# Patient Record
Sex: MALE | Race: WHITE | Employment: FULL TIME | URBAN - METROPOLITAN AREA
[De-identification: names, ages, dates, MRNs, and addresses within clinical notes are randomized per-mention and may not be internally consistent; named-entity substitution may affect disease eponyms.]

---

## 2017-04-27 ENCOUNTER — APPOINTMENT (OUTPATIENT)
Dept: CT IMAGING | Age: 47
End: 2017-04-27
Attending: EMERGENCY MEDICINE
Payer: COMMERCIAL

## 2017-04-27 ENCOUNTER — HOSPITAL ENCOUNTER (EMERGENCY)
Age: 47
Discharge: HOME | End: 2017-04-27
Attending: EMERGENCY MEDICINE | Admitting: EMERGENCY MEDICINE
Payer: COMMERCIAL

## 2017-04-27 VITALS
WEIGHT: 165.34 LBS | HEART RATE: 65 BPM | RESPIRATION RATE: 16 BRPM | DIASTOLIC BLOOD PRESSURE: 82 MMHG | BODY MASS INDEX: 25.06 KG/M2 | TEMPERATURE: 97.6 F | SYSTOLIC BLOOD PRESSURE: 120 MMHG | HEIGHT: 68 IN | OXYGEN SATURATION: 96 %

## 2017-04-27 DIAGNOSIS — G43.009 NONINTRACTABLE MIGRAINE, UNSPECIFIED MIGRAINE TYPE: Primary | ICD-10-CM

## 2017-04-27 LAB
ALBUMIN SERPL BCP-MCNC: 3.7 G/DL (ref 3.5–5)
ALBUMIN/GLOB SERPL: 1.2 {RATIO} (ref 1.1–2.2)
ALP SERPL-CCNC: 35 U/L (ref 45–117)
ALT SERPL-CCNC: 29 U/L (ref 12–78)
ANION GAP BLD CALC-SCNC: 8 MMOL/L (ref 5–15)
AST SERPL W P-5'-P-CCNC: 13 U/L (ref 15–37)
BASOPHILS # BLD AUTO: 0 K/UL (ref 0–0.1)
BASOPHILS # BLD: 0 % (ref 0–1)
BILIRUB SERPL-MCNC: 0.3 MG/DL (ref 0.2–1)
BUN SERPL-MCNC: 15 MG/DL (ref 6–20)
BUN/CREAT SERPL: 16 (ref 12–20)
CALCIUM SERPL-MCNC: 9 MG/DL (ref 8.5–10.1)
CHLORIDE SERPL-SCNC: 104 MMOL/L (ref 97–108)
CO2 SERPL-SCNC: 29 MMOL/L (ref 21–32)
CREAT SERPL-MCNC: 0.93 MG/DL (ref 0.7–1.3)
EOSINOPHIL # BLD: 0.3 K/UL (ref 0–0.4)
EOSINOPHIL NFR BLD: 4 % (ref 0–7)
ERYTHROCYTE [DISTWIDTH] IN BLOOD BY AUTOMATED COUNT: 12.7 % (ref 11.5–14.5)
GLOBULIN SER CALC-MCNC: 3.1 G/DL (ref 2–4)
GLUCOSE SERPL-MCNC: 109 MG/DL (ref 65–100)
HCT VFR BLD AUTO: 37.5 % (ref 36.6–50.3)
HGB BLD-MCNC: 13 G/DL (ref 12.1–17)
LYMPHOCYTES # BLD AUTO: 48 % (ref 12–49)
LYMPHOCYTES # BLD: 3.6 K/UL (ref 0.8–3.5)
MCH RBC QN AUTO: 30.7 PG (ref 26–34)
MCHC RBC AUTO-ENTMCNC: 34.7 G/DL (ref 30–36.5)
MCV RBC AUTO: 88.4 FL (ref 80–99)
MONOCYTES # BLD: 0.7 K/UL (ref 0–1)
MONOCYTES NFR BLD AUTO: 9 % (ref 5–13)
NEUTS SEG # BLD: 2.9 K/UL (ref 1.8–8)
NEUTS SEG NFR BLD AUTO: 39 % (ref 32–75)
PLATELET # BLD AUTO: 242 K/UL (ref 150–400)
POTASSIUM SERPL-SCNC: 3.6 MMOL/L (ref 3.5–5.1)
PROT SERPL-MCNC: 6.8 G/DL (ref 6.4–8.2)
RBC # BLD AUTO: 4.24 M/UL (ref 4.1–5.7)
SODIUM SERPL-SCNC: 141 MMOL/L (ref 136–145)
WBC # BLD AUTO: 7.6 K/UL (ref 4.1–11.1)

## 2017-04-27 PROCEDURE — 70450 CT HEAD/BRAIN W/O DYE: CPT

## 2017-04-27 PROCEDURE — 80053 COMPREHEN METABOLIC PANEL: CPT | Performed by: EMERGENCY MEDICINE

## 2017-04-27 PROCEDURE — 99283 EMERGENCY DEPT VISIT LOW MDM: CPT

## 2017-04-27 PROCEDURE — 74011250636 HC RX REV CODE- 250/636: Performed by: EMERGENCY MEDICINE

## 2017-04-27 PROCEDURE — 85025 COMPLETE CBC W/AUTO DIFF WBC: CPT | Performed by: EMERGENCY MEDICINE

## 2017-04-27 PROCEDURE — 96375 TX/PRO/DX INJ NEW DRUG ADDON: CPT

## 2017-04-27 PROCEDURE — 36415 COLL VENOUS BLD VENIPUNCTURE: CPT | Performed by: EMERGENCY MEDICINE

## 2017-04-27 PROCEDURE — 96365 THER/PROPH/DIAG IV INF INIT: CPT

## 2017-04-27 RX ORDER — BUTALBITAL, ACETAMINOPHEN AND CAFFEINE 300; 40; 50 MG/1; MG/1; MG/1
1-2 CAPSULE ORAL
Qty: 20 CAP | Refills: 0 | Status: ON HOLD | OUTPATIENT
Start: 2017-04-27 | End: 2021-09-15

## 2017-04-27 RX ORDER — PROCHLORPERAZINE EDISYLATE 5 MG/ML
10 INJECTION INTRAMUSCULAR; INTRAVENOUS
Status: COMPLETED | OUTPATIENT
Start: 2017-04-27 | End: 2017-04-27

## 2017-04-27 RX ORDER — MAGNESIUM SULFATE 1 G/100ML
1 INJECTION INTRAVENOUS
Status: COMPLETED | OUTPATIENT
Start: 2017-04-27 | End: 2017-04-27

## 2017-04-27 RX ORDER — DEXAMETHASONE SODIUM PHOSPHATE 4 MG/ML
10 INJECTION, SOLUTION INTRA-ARTICULAR; INTRALESIONAL; INTRAMUSCULAR; INTRAVENOUS; SOFT TISSUE
Status: COMPLETED | OUTPATIENT
Start: 2017-04-27 | End: 2017-04-27

## 2017-04-27 RX ORDER — DEXAMETHASONE SODIUM PHOSPHATE 4 MG/ML
INJECTION, SOLUTION INTRA-ARTICULAR; INTRALESIONAL; INTRAMUSCULAR; INTRAVENOUS; SOFT TISSUE
Status: DISCONTINUED
Start: 2017-04-27 | End: 2017-04-27 | Stop reason: HOSPADM

## 2017-04-27 RX ADMIN — DEXAMETHASONE SODIUM PHOSPHATE 10 MG: 4 INJECTION, SOLUTION INTRAMUSCULAR; INTRAVENOUS at 01:28

## 2017-04-27 RX ADMIN — MAGNESIUM SULFATE HEPTAHYDRATE 1 G: 1 INJECTION, SOLUTION INTRAVENOUS at 01:26

## 2017-04-27 RX ADMIN — PROCHLORPERAZINE EDISYLATE 10 MG: 5 INJECTION INTRAMUSCULAR; INTRAVENOUS at 01:28

## 2017-04-27 RX ADMIN — SODIUM CHLORIDE 1000 ML: 900 INJECTION, SOLUTION INTRAVENOUS at 01:26

## 2017-04-27 NOTE — ED PROVIDER NOTES
HPI Comments: Herman Copeland is a 48 y.o. male with no significant PMHx who presents ambulatory to the ED with c/o a 10/10 headache that began ~10:30 tonight with associated photophobia. Pt reports he had a similar HA this morning and one yesterday, but neither were as bad as tonight's. Pt reports he had an episode of blurry, \"spotty\" vision yesterday while he was at work. Pt states he tries to limit his caffeine intake to 16 oz daily. Pt denies any h/o migraines. He denies any changes in the amount he sleeps, other changes to his usual routines, or increased stress. Pt specifically denies any weakness in arms or legs, fevers, or chills. PCP: Christo Maldonado MD     Social hx: - Smoker, + EtOH, - Illicit Drugs    There are no other complaints, changes, or physical findings at this time. Written by DAGO Bhatia, as dictated by Marilyn Zuniga MD.      The history is provided by the patient. No  was used. No past medical history on file. No past surgical history on file. Family History:   Problem Relation Age of Onset    Diabetes Father     Cancer Father        Social History     Social History    Marital status:      Spouse name: N/A    Number of children: N/A    Years of education: N/A     Occupational History    Not on file. Social History Main Topics    Smoking status: Never Smoker    Smokeless tobacco: Never Used    Alcohol use 3.0 oz/week     6 Cans of beer per week      Comment: weekly    Drug use: No    Sexual activity: Yes     Partners: Male     Other Topics Concern    Not on file     Social History Narrative    No narrative on file         ALLERGIES: Pcn [penicillins]    Review of Systems   Constitutional: Negative for chills and fever. HENT: Negative for congestion, rhinorrhea, sneezing and sore throat. Eyes: Positive for photophobia. Negative for redness and visual disturbance. Respiratory: Negative.   Negative for cough, shortness of breath and wheezing. Cardiovascular: Negative. Negative for chest pain and leg swelling. Gastrointestinal: Negative. Negative for abdominal pain, diarrhea, nausea and vomiting. Genitourinary: Negative. Negative for difficulty urinating, discharge and frequency. Musculoskeletal: Negative. Negative for arthralgias, back pain, myalgias and neck stiffness. Skin: Negative. Negative for color change and rash. Neurological: Positive for headaches. Negative for dizziness, syncope, weakness and numbness. Hematological: Negative for adenopathy. Psychiatric/Behavioral: Negative. All other systems reviewed and are negative. Patient Vitals for the past 24 hrs:   Temp Pulse Resp BP SpO2   04/27/17 0227 - - - 120/82 96 %   04/27/17 0215 - - - - 97 %   04/27/17 0130 - - - 120/78 97 %   04/27/17 0126 - 65 - 123/81 -   04/27/17 0030 97.6 °F (36.4 °C) (!) 55 16 134/88 100 %            Physical Exam   Constitutional: He is oriented to person, place, and time. HENT:   Head: Atraumatic. Eyes: EOM are normal.   + Photophobia   Neck: No spinous process tenderness and no muscular tenderness present.   + Full ROM  - Meningismus signs     Cardiovascular: Normal rate, regular rhythm, normal heart sounds and intact distal pulses. Exam reveals no gallop and no friction rub. No murmur heard. Pulmonary/Chest: Effort normal and breath sounds normal. No respiratory distress. He has no wheezes. He has no rales. He exhibits no tenderness. Abdominal: Soft. Bowel sounds are normal. He exhibits no distension and no mass. There is no tenderness. There is no rebound and no guarding. Musculoskeletal: Normal range of motion. He exhibits no edema or tenderness. Neurological: He is alert and oriented to person, place, and time.    EOM intact, pupils direct and consensual, reflexes intact, CN II-XII grossly intact, strength equal and symmetric, alert and oriented     Psychiatric: He has a normal mood and affect. Nursing note and vitals reviewed. MDM  Number of Diagnoses or Management Options  Nonintractable migraine, unspecified migraine type:   Diagnosis management comments: Assessment/Plan: Migraine, headache, cluster HA, tension HA. Low suspicion for subarachnoid etiology given intermittent nature of symptoms over the past two days. Pt is driving home and prefers not to have any sedating medications. Amount and/or Complexity of Data Reviewed  Clinical lab tests: ordered and reviewed  Tests in the radiology section of CPT®: ordered and reviewed  Review and summarize past medical records: yes    Patient Progress  Patient progress: stable        Procedures  Progress note:  2:27 AM  Pt reports his HA feels much better now. He feels comfortable to go home, and would like to be discharged. Written by Dyan Suazo ED Scribe, as dictated by Kate Contreras MD.    LABORATORY TESTS:  Recent Results (from the past 12 hour(s))   CBC WITH AUTOMATED DIFF    Collection Time: 04/27/17  1:04 AM   Result Value Ref Range    WBC 7.6 4.1 - 11.1 K/uL    RBC 4.24 4.10 - 5.70 M/uL    HGB 13.0 12.1 - 17.0 g/dL    HCT 37.5 36.6 - 50.3 %    MCV 88.4 80.0 - 99.0 FL    MCH 30.7 26.0 - 34.0 PG    MCHC 34.7 30.0 - 36.5 g/dL    RDW 12.7 11.5 - 14.5 %    PLATELET 762 379 - 905 K/uL    NEUTROPHILS 39 32 - 75 %    LYMPHOCYTES 48 12 - 49 %    MONOCYTES 9 5 - 13 %    EOSINOPHILS 4 0 - 7 %    BASOPHILS 0 0 - 1 %    ABS. NEUTROPHILS 2.9 1.8 - 8.0 K/UL    ABS. LYMPHOCYTES 3.6 (H) 0.8 - 3.5 K/UL    ABS. MONOCYTES 0.7 0.0 - 1.0 K/UL    ABS. EOSINOPHILS 0.3 0.0 - 0.4 K/UL    ABS.  BASOPHILS 0.0 0.0 - 0.1 K/UL   METABOLIC PANEL, COMPREHENSIVE    Collection Time: 04/27/17  1:04 AM   Result Value Ref Range    Sodium 141 136 - 145 mmol/L    Potassium 3.6 3.5 - 5.1 mmol/L    Chloride 104 97 - 108 mmol/L    CO2 29 21 - 32 mmol/L    Anion gap 8 5 - 15 mmol/L    Glucose 109 (H) 65 - 100 mg/dL    BUN 15 6 - 20 MG/DL    Creatinine 0.93 0.70 - 1.30 MG/DL    BUN/Creatinine ratio 16 12 - 20      GFR est AA >60 >60 ml/min/1.73m2    GFR est non-AA >60 >60 ml/min/1.73m2    Calcium 9.0 8.5 - 10.1 MG/DL    Bilirubin, total 0.3 0.2 - 1.0 MG/DL    ALT (SGPT) 29 12 - 78 U/L    AST (SGOT) 13 (L) 15 - 37 U/L    Alk. phosphatase 35 (L) 45 - 117 U/L    Protein, total 6.8 6.4 - 8.2 g/dL    Albumin 3.7 3.5 - 5.0 g/dL    Globulin 3.1 2.0 - 4.0 g/dL    A-G Ratio 1.2 1.1 - 2.2         IMAGING RESULTS:  CT HEAD WO CONT   Final Result   EXAM: CT HEAD WO CONT  Clinical history: Severe headache  INDICATION: headache     COMPARISON: None.     TECHNIQUE: Unenhanced CT of the head was performed using 5 mm images. Brain and  bone windows were generated. CT dose reduction was achieved through use of a  standardized protocol tailored for this examination and automatic exposure  control for dose modulation.      FINDINGS:  The ventricles and sulci are normal in size, shape and configuration and  midline. There is no significant white matter disease. There is no intracranial  hemorrhage, extra-axial collection, mass, mass effect or midline shift. The  basilar cisterns are open. No acute infarct is identified. The bone windows  demonstrate no abnormalities. The visualized portions of the paranasal sinuses  and mastoid air cells are clear.     IMPRESSION  IMPRESSION:      Normal CT scan of the head. MEDICATIONS GIVEN:  Medications   sodium chloride 0.9 % bolus infusion 1,000 mL (0 mL IntraVENous IV Completed 4/27/17 0236)   prochlorperazine (COMPAZINE) injection 10 mg (10 mg IntraVENous Given 4/27/17 0128)   dexamethasone (DECADRON) 4 mg/mL injection 10 mg ( IntraVENous Canceled Entry 4/27/17 0137)   magnesium sulfate 1 g/100 ml IVPB (premix or compounded) (0 g IntraVENous IV Completed 4/27/17 0236)       IMPRESSION:  1. Nonintractable migraine, unspecified migraine type        PLAN:  1.    Discharge Medication List as of 4/27/2017  2:30 AM      START taking these medications Details   butalbital-acetaminophen-caff (FIORICET) -40 mg per capsule Take 1-2 Caps by mouth every four (4) hours as needed for Headache. Max Daily Amount: 6 Caps., Print, Disp-20 Cap, R-0         CONTINUE these medications which have NOT CHANGED    Details   omeprazole (PRILOSEC) 20 mg capsule Take 1 Cap by mouth daily. Normal, 20 mg, Disp-15 Cap, R-1           2. Follow-up Information     Follow up With Details Comments [address], [place] In 1 day      [facility] EMERGENCY DEPT  As needed, If symptoms worsen [address] [place] [address]  [phone]        Return to ED if worse     DISCHARGE NOTE:  2:28 AM  The patient is ready for discharge. The patients signs, symptoms, diagnosis, and instructions for discharge have been discussed and the pt has conveyed their understanding. The patient is to follow up as recommended with Joy Doherty MD or return to the ER should their symptoms worsen. Plan has been discussed and patient has conveyed their agreement. This note is prepared by David Alanis, acting as Scribe for Juvenal Mendieta MD.    Juvenal Mendieta MD: The scribe's documentation has been prepared under my direction and personally reviewed by me in its entirety. I confirm that the note above accurately reflects all work, treatment, procedures, and medical decision making performed by me.

## 2017-04-27 NOTE — DISCHARGE INSTRUCTIONS
Migraine Headache: Care Instructions  Your Care Instructions  Migraines are painful, throbbing headaches that often start on one side of the head. They may cause nausea and vomiting and make you sensitive to light, sound, or smell. Without treatment, migraines can last from 4 hours to a few days. Medicines can help prevent migraines or stop them after they have started. Your doctor can help you find which ones work best for you. Follow-up care is a key part of your treatment and safety. Be sure to make and go to all appointments, and call your doctor if you are having problems. It's also a good idea to know your test results and keep a list of the medicines you take. How can you care for yourself at home? · Do not drive if you have taken a prescription pain medicine. · Rest in a quiet, dark room until your headache is gone. Close your eyes, and try to relax or go to sleep. Don't watch TV or read. · Put a cold, moist cloth or cold pack on the painful area for 10 to 20 minutes at a time. Put a thin cloth between the cold pack and your skin. · Use a warm, moist towel or a heating pad set on low to relax tight shoulder and neck muscles. · Have someone gently massage your neck and shoulders. · Take your medicines exactly as prescribed. Call your doctor if you think you are having a problem with your medicine. You will get more details on the specific medicines your doctor prescribes. · Be careful not to take pain medicine more often than the instructions allow. You could get worse or more frequent headaches when the medicine wears off. To prevent migraines  · Keep a headache diary so you can figure out what triggers your headaches. Avoiding triggers may help you prevent headaches. Record when each headache began, how long it lasted, and what the pain was like.  (Was it throbbing, aching, stabbing, or dull?) Write down any other symptoms you had with the headache, such as nausea, flashing lights or dark spots, or sensitivity to bright light or loud noise. Note if the headache occurred near your period. List anything that might have triggered the headache. Triggers may include certain foods (chocolate, cheese, wine) or odors, smoke, bright light, stress, or lack of sleep. · If your doctor has prescribed medicine for your migraines, take it as directed. You may have medicine that you take only when you get a migraine and medicine that you take all the time to help prevent migraines. ¨ If your doctor has prescribed medicine for when you get a headache, take it at the first sign of a migraine, unless your doctor has given you other instructions. ¨ If your doctor has prescribed medicine to prevent migraines, take it exactly as prescribed. Call your doctor if you think you are having a problem with your medicine. · Find healthy ways to deal with stress. Migraines are most common during or right after stressful times. Take time to relax before and after you do something that has caused a migraine in the past.  · Try to keep your muscles relaxed by keeping good posture. Check your jaw, face, neck, and shoulder muscles for tension. Try to relax them. When you sit at a desk, change positions often. And make sure to stretch for 30 seconds each hour. · Get plenty of sleep and exercise. · Eat meals on a regular schedule. Avoid foods and drinks that often trigger migraines. These include chocolate, alcohol (especially red wine and port), aspartame, monosodium glutamate (MSG), and some additives found in foods (such as hot dogs, norris, cold cuts, aged cheeses, and pickled foods). · Limit caffeine. Don't drink too much coffee, tea, or soda. But don't quit caffeine suddenly. That can also give you migraines. · Do not smoke or allow others to smoke around you. If you need help quitting, talk to your doctor about stop-smoking programs and medicines. These can increase your chances of quitting for good.   · If you are taking birth control pills or hormone therapy, talk to your doctor about whether they are triggering your migraines. When should you call for help? Call 911 anytime you think you may need emergency care. For example, call if:  · You have signs of a stroke. These may include:  ¨ Sudden numbness, paralysis, or weakness in your face, arm, or leg, especially on only one side of your body. ¨ Sudden vision changes. ¨ Sudden trouble speaking. ¨ Sudden confusion or trouble understanding simple statements. ¨ Sudden problems with walking or balance. ¨ A sudden, severe headache that is different from past headaches. Call your doctor now or seek immediate medical care if:  · You have new or worse nausea and vomiting. · You have a new or higher fever. · Your headache gets much worse. Watch closely for changes in your health, and be sure to contact your doctor if:  · You are not getting better after 2 days (48 hours). Where can you learn more? Go to http://mark-jennifer.info/. Enter H966 in the search box to learn more about \"Migraine Headache: Care Instructions. \"  Current as of: October 14, 2016  Content Version: 11.2  © 7940-4102 [employer]. Care instructions adapted under license by [employer] (which disclaims liability or warranty for this information). If you have questions about a medical condition or this instruction, always ask your healthcare professional. [address] any warranty or liability for your use of this information.

## 2017-04-27 NOTE — ED NOTES
Pt resting in stretcher. Call bell within reach. Pt reporting has had a headache on and off over the past 2 days. Attempted to use with OTC ibuprofen with little relief. Pt medicated. No other complaints voiced at this time. Radiology at the bedside to transport pt ELDA for CT scan.

## 2018-06-15 ENCOUNTER — HOSPITAL ENCOUNTER (OUTPATIENT)
Dept: ULTRASOUND IMAGING | Age: 48
Discharge: HOME | End: 2018-06-15
Payer: COMMERCIAL

## 2018-06-15 DIAGNOSIS — N50.811 TESTICULAR PAIN, RIGHT: ICD-10-CM

## 2018-06-15 PROCEDURE — 76870 US EXAM SCROTUM: CPT

## 2018-09-20 ENCOUNTER — HOSPITAL ENCOUNTER (EMERGENCY)
Age: 48
Discharge: HOME | End: 2018-09-20
Attending: EMERGENCY MEDICINE
Payer: COMMERCIAL

## 2018-09-20 VITALS
SYSTOLIC BLOOD PRESSURE: 128 MMHG | DIASTOLIC BLOOD PRESSURE: 79 MMHG | BODY MASS INDEX: 25.66 KG/M2 | RESPIRATION RATE: 14 BRPM | HEART RATE: 73 BPM | OXYGEN SATURATION: 96 % | HEIGHT: 68 IN | WEIGHT: 169.31 LBS | TEMPERATURE: 98.5 F

## 2018-09-20 DIAGNOSIS — T14.8XXA BITE: Primary | ICD-10-CM

## 2018-09-20 PROCEDURE — 74011250636 HC RX REV CODE- 250/636: Performed by: STUDENT IN AN ORGANIZED HEALTH CARE EDUCATION/TRAINING PROGRAM

## 2018-09-20 PROCEDURE — 99284 EMERGENCY DEPT VISIT MOD MDM: CPT

## 2018-09-20 PROCEDURE — 90715 TDAP VACCINE 7 YRS/> IM: CPT | Performed by: STUDENT IN AN ORGANIZED HEALTH CARE EDUCATION/TRAINING PROGRAM

## 2018-09-20 PROCEDURE — 90471 IMMUNIZATION ADMIN: CPT

## 2018-09-20 RX ORDER — DOXYCYCLINE 50 MG/1
50 TABLET ORAL 2 TIMES DAILY
Status: ON HOLD | COMMUNITY
End: 2021-09-15

## 2018-09-20 RX ORDER — KETOROLAC TROMETHAMINE 30 MG/ML
30 INJECTION, SOLUTION INTRAMUSCULAR; INTRAVENOUS
Status: DISCONTINUED | OUTPATIENT
Start: 2018-09-20 | End: 2018-09-21 | Stop reason: HOSPADM

## 2018-09-20 RX ADMIN — TETANUS TOXOID, REDUCED DIPHTHERIA TOXOID AND ACELLULAR PERTUSSIS VACCINE, ADSORBED 0.5 ML: 5; 2.5; 8; 8; 2.5 SUSPENSION INTRAMUSCULAR at 23:03

## 2018-09-21 NOTE — ED TRIAGE NOTES
Assumed care of pt from triage. Pt is A&O x 4. Pt reports CC of right foot pain following what pt believes was a snake bite. Pt states he had sandals on when the incident occurred. Pt states he did not see the snake but copperheads frequent the area. Pt placed on monitor x 3. VSS at this time. Pt resting on stretcher in POC with call bell in hand and family at bedside. Marked area of redness around big toe.

## 2018-09-21 NOTE — ED PROVIDER NOTES
EMERGENCY DEPARTMENT HISTORY AND PHYSICAL EXAM      Date: 9/20/2018  Patient Name: Mattie Gay    History of Presenting Illness     Chief Complaint   Patient presents with    Snake Bite     pt ambulatory with steady gait into triage with c/o snake bite to R great toe. Swelling and reddness noted. Reports incident occured at approx 2020. History Provided By: Patient    HPI: Mattie Gay, 50 y.o. male no significant PMHx, presents to the ED with cc of bite. Onset approx 2020, states he was walking outside in the dark with sandals when he felt acute sharp pain to his R big toe, showed a flashlight in the area but was unable to see anything in particular, though was concerned about a snake bite. Went inside and noticed one punctate lesion. His toe began to swell prompting his visit today. Denies any associated symptoms including fever, chills, chest pain, shortness of breath, nausea, vomiting. No prior history of anaphylaxis. There are no other complaints, changes, or physical findings at this time. PCP: Markel Landis, DO    Current Outpatient Prescriptions   Medication Sig Dispense Refill    doxycycline (ADOXA) 50 mg tablet Take 50 mg by mouth two (2) times a day.  butalbital-acetaminophen-caff (FIORICET) -40 mg per capsule Take 1-2 Caps by mouth every four (4) hours as needed for Headache. Max Daily Amount: 6 Caps. 20 Cap 0    omeprazole (PRILOSEC) 20 mg capsule Take 1 Cap by mouth daily. 15 Cap 1       Past History     Past Medical History:  History reviewed. No pertinent past medical history. Past Surgical History:  History reviewed. No pertinent surgical history. Family History:  Family History   Problem Relation Age of Onset    Diabetes Father     Cancer Father        Social History:  Social History   Substance Use Topics    Smoking status: Never Smoker    Smokeless tobacco: Never Used    Alcohol use 8.4 oz/week     14 Cans of beer per week       Allergies:   Allergies Allergen Reactions    Pcn [Penicillins] Hives         Review of Systems   Review of Systems   Constitutional: Negative for chills and fever. Eyes: Negative for photophobia and visual disturbance. Respiratory: Negative for shortness of breath and wheezing. Cardiovascular: Negative for chest pain and leg swelling. Gastrointestinal: Negative for abdominal pain, nausea and vomiting. Genitourinary: Negative for dysuria and hematuria. Musculoskeletal: Negative for back pain and neck pain. Skin: Positive for rash. Neurological: Negative for dizziness, weakness, numbness and headaches. Physical Exam   Physical Exam   Constitutional: He appears well-developed and well-nourished. No distress. HENT:   Head: Normocephalic. Eyes: Pupils are equal, round, and reactive to light. Right eye exhibits no discharge. Neck: No JVD present. No tracheal deviation present. Cardiovascular: Normal rate and regular rhythm. Exam reveals no gallop and no friction rub. No murmur heard. Pulmonary/Chest: No respiratory distress. He has no wheezes. He has no rales. Abdominal: He exhibits no distension. There is no tenderness. There is no rebound and no guarding. Musculoskeletal:   Erythema and swelling to the R big toe, extending to the 1st MTP. Non-circumferential.   Neurological: He is alert. Normal speech, moving all extremities   Skin: Skin is warm and dry. No rash noted. Psychiatric: His behavior is normal.       Diagnostic Study Results     Labs -   No results found for this or any previous visit (from the past 12 hour(s)). Radiologic Studies -   No orders to display     CT Results  (Last 48 hours)    None        CXR Results  (Last 48 hours)    None            Medical Decision Making   I am the first provider for this patient. I reviewed the vital signs, available nursing notes, past medical history, past surgical history, family history and social history.     Vital Signs-Reviewed the patient's vital signs. Patient Vitals for the past 12 hrs:   Temp Pulse Resp BP SpO2   09/20/18 2121 98.5 °F (36.9 °C) 82 16 140/89 99 %       Pulse Oximetry Analysis - 99% on RA    Cardiac Monitor:   Rate: 82 bpm  Rhythm: Normal Sinus Rhythm      Records Reviewed: Nursing Notes and Old Medical Records    Provider Notes (Medical Decision Making):   50 yaer old presenting with swelling to R big toe. Likely insect bite, including hymenoptera. Snake bite possible given concern. No suspicion for anaphylaxis at this time. Will obs for 2 hours, reassess swelling. Toradol for pain, update Tdap. ED Course:   Initial assessment performed. The patients presenting problems have been discussed, and they are in agreement with the care plan formulated and outlined with them. I have encouraged them to ask questions as they arise throughout their visit. 11:44 PM pain and swelling now improved, patient feels comfortable going home. Return precautions provided. Critical Care Time:   0 min    Disposition:  Discharge    PLAN:  1. Current Discharge Medication List        2. Follow-up Information     None        Return to ED if worse     Diagnosis     Clinical Impression:   1.  [address]  9/20/2018      Attestations:

## 2018-09-21 NOTE — ED NOTES
Per Dr Marshal Yañez, we will not be giving crofab at this point.   Will continue to monitor pt, If pt's swelling gets worse will notify MD.

## 2018-09-21 NOTE — ED NOTES
Bedside and Verbal shift change report given to Joe TORRES Report included the following information SBAR, ED Summary and Recent Results.

## 2021-09-14 ENCOUNTER — HOSPITAL ENCOUNTER (INPATIENT)
Age: 51
LOS: 3 days | Discharge: HOME | DRG: 392 | End: 2021-09-17
Attending: EMERGENCY MEDICINE | Admitting: SURGERY
Payer: COMMERCIAL

## 2021-09-14 DIAGNOSIS — K57.30 DIVERTICULAR DISEASE OF LARGE INTESTINE WITH COMPLICATION: Primary | ICD-10-CM

## 2021-09-14 LAB
ALBUMIN SERPL-MCNC: 3.3 G/DL (ref 3.5–5)
ALBUMIN/GLOB SERPL: 0.7 {RATIO} (ref 1.1–2.2)
ALP SERPL-CCNC: 55 U/L (ref 45–117)
ALT SERPL-CCNC: 23 U/L (ref 12–78)
ANION GAP SERPL CALC-SCNC: 6 MMOL/L (ref 5–15)
APPEARANCE UR: CLEAR
AST SERPL-CCNC: 14 U/L (ref 15–37)
BACTERIA URNS QL MICRO: NEGATIVE /HPF
BILIRUB SERPL-MCNC: 0.7 MG/DL (ref 0.2–1)
BILIRUB UR QL: NEGATIVE
BUN SERPL-MCNC: 9 MG/DL (ref 6–20)
BUN/CREAT SERPL: 9 (ref 12–20)
CALCIUM SERPL-MCNC: 8.9 MG/DL (ref 8.5–10.1)
CHLORIDE SERPL-SCNC: 101 MMOL/L (ref 97–108)
CO2 SERPL-SCNC: 29 MMOL/L (ref 21–32)
COLOR UR: ABNORMAL
CREAT SERPL-MCNC: 0.95 MG/DL (ref 0.7–1.3)
EPITH CASTS URNS QL MICRO: ABNORMAL /LPF
ERYTHROCYTE [DISTWIDTH] IN BLOOD BY AUTOMATED COUNT: 12.8 % (ref 11.5–14.5)
GLOBULIN SER CALC-MCNC: 4.5 G/DL (ref 2–4)
GLUCOSE SERPL-MCNC: 91 MG/DL (ref 65–100)
GLUCOSE UR STRIP.AUTO-MCNC: NEGATIVE MG/DL
HCT VFR BLD AUTO: 40.6 % (ref 36.6–50.3)
HGB BLD-MCNC: 13.3 G/DL (ref 12.1–17)
HGB UR QL STRIP: NEGATIVE
KETONES UR QL STRIP.AUTO: 40 MG/DL
LACTATE BLD-SCNC: 0.75 MMOL/L (ref 0.4–2)
LEUKOCYTE ESTERASE UR QL STRIP.AUTO: NEGATIVE
LIPASE SERPL-CCNC: 61 U/L (ref 73–393)
MCH RBC QN AUTO: 30 PG (ref 26–34)
MCHC RBC AUTO-ENTMCNC: 32.8 G/DL (ref 30–36.5)
MCV RBC AUTO: 91.4 FL (ref 80–99)
NITRITE UR QL STRIP.AUTO: NEGATIVE
NRBC # BLD: 0 K/UL (ref 0–0.01)
NRBC BLD-RTO: 0 PER 100 WBC
PH UR STRIP: 5.5 [PH] (ref 5–8)
PLATELET # BLD AUTO: 232 K/UL (ref 150–400)
PMV BLD AUTO: 9.7 FL (ref 8.9–12.9)
POTASSIUM SERPL-SCNC: 3.7 MMOL/L (ref 3.5–5.1)
PROT SERPL-MCNC: 7.8 G/DL (ref 6.4–8.2)
PROT UR STRIP-MCNC: NEGATIVE MG/DL
RBC # BLD AUTO: 4.44 M/UL (ref 4.1–5.7)
RBC #/AREA URNS HPF: ABNORMAL /HPF (ref 0–5)
SODIUM SERPL-SCNC: 136 MMOL/L (ref 136–145)
SP GR UR REFRACTOMETRY: <1.005 (ref 1–1.03)
UA: UC IF INDICATED,UAUC: ABNORMAL
UROBILINOGEN UR QL STRIP.AUTO: 0.2 EU/DL (ref 0.2–1)
WBC # BLD AUTO: 12.2 K/UL (ref 4.1–11.1)
WBC URNS QL MICRO: ABNORMAL /HPF (ref 0–4)

## 2021-09-14 PROCEDURE — 74011250636 HC RX REV CODE- 250/636: Performed by: EMERGENCY MEDICINE

## 2021-09-14 PROCEDURE — 74011250636 HC RX REV CODE- 250/636: Performed by: SURGERY

## 2021-09-14 PROCEDURE — 83605 ASSAY OF LACTIC ACID: CPT

## 2021-09-14 PROCEDURE — 83690 ASSAY OF LIPASE: CPT

## 2021-09-14 PROCEDURE — 96374 THER/PROPH/DIAG INJ IV PUSH: CPT

## 2021-09-14 PROCEDURE — 65270000029 HC RM PRIVATE

## 2021-09-14 PROCEDURE — 87040 BLOOD CULTURE FOR BACTERIA: CPT

## 2021-09-14 PROCEDURE — 81001 URINALYSIS AUTO W/SCOPE: CPT

## 2021-09-14 PROCEDURE — 85027 COMPLETE CBC AUTOMATED: CPT

## 2021-09-14 PROCEDURE — 36415 COLL VENOUS BLD VENIPUNCTURE: CPT

## 2021-09-14 PROCEDURE — 99283 EMERGENCY DEPT VISIT LOW MDM: CPT

## 2021-09-14 PROCEDURE — 80053 COMPREHEN METABOLIC PANEL: CPT

## 2021-09-14 RX ORDER — LEVOFLOXACIN 5 MG/ML
750 INJECTION, SOLUTION INTRAVENOUS
Status: COMPLETED | OUTPATIENT
Start: 2021-09-14 | End: 2021-09-14

## 2021-09-14 RX ORDER — METRONIDAZOLE 500 MG/100ML
500 INJECTION, SOLUTION INTRAVENOUS
Status: COMPLETED | OUTPATIENT
Start: 2021-09-14 | End: 2021-09-14

## 2021-09-14 RX ORDER — LEVOFLOXACIN 5 MG/ML
500 INJECTION, SOLUTION INTRAVENOUS EVERY 24 HOURS
Status: DISCONTINUED | OUTPATIENT
Start: 2021-09-15 | End: 2021-09-17 | Stop reason: HOSPADM

## 2021-09-14 RX ORDER — ACETAMINOPHEN 325 MG/1
650 TABLET ORAL
Status: DISCONTINUED | OUTPATIENT
Start: 2021-09-14 | End: 2021-09-17 | Stop reason: HOSPADM

## 2021-09-14 RX ORDER — SODIUM CHLORIDE 0.9 % (FLUSH) 0.9 %
5-40 SYRINGE (ML) INJECTION AS NEEDED
Status: DISCONTINUED | OUTPATIENT
Start: 2021-09-14 | End: 2021-09-17 | Stop reason: HOSPADM

## 2021-09-14 RX ORDER — DIPHENHYDRAMINE HYDROCHLORIDE 50 MG/ML
25 INJECTION, SOLUTION INTRAMUSCULAR; INTRAVENOUS
Status: DISCONTINUED | OUTPATIENT
Start: 2021-09-14 | End: 2021-09-17 | Stop reason: HOSPADM

## 2021-09-14 RX ORDER — SODIUM CHLORIDE 0.9 % (FLUSH) 0.9 %
5-40 SYRINGE (ML) INJECTION EVERY 8 HOURS
Status: DISCONTINUED | OUTPATIENT
Start: 2021-09-14 | End: 2021-09-17 | Stop reason: HOSPADM

## 2021-09-14 RX ORDER — ONDANSETRON 2 MG/ML
4 INJECTION INTRAMUSCULAR; INTRAVENOUS
Status: DISCONTINUED | OUTPATIENT
Start: 2021-09-14 | End: 2021-09-17 | Stop reason: HOSPADM

## 2021-09-14 RX ORDER — DEXTROSE, SODIUM CHLORIDE, AND POTASSIUM CHLORIDE 5; .45; .15 G/100ML; G/100ML; G/100ML
125 INJECTION INTRAVENOUS CONTINUOUS
Status: DISCONTINUED | OUTPATIENT
Start: 2021-09-14 | End: 2021-09-17 | Stop reason: HOSPADM

## 2021-09-14 RX ORDER — HYDROMORPHONE HYDROCHLORIDE 1 MG/ML
0.5 INJECTION, SOLUTION INTRAMUSCULAR; INTRAVENOUS; SUBCUTANEOUS
Status: DISCONTINUED | OUTPATIENT
Start: 2021-09-14 | End: 2021-09-17 | Stop reason: HOSPADM

## 2021-09-14 RX ORDER — NALOXONE HYDROCHLORIDE 0.4 MG/ML
0.4 INJECTION, SOLUTION INTRAMUSCULAR; INTRAVENOUS; SUBCUTANEOUS AS NEEDED
Status: DISCONTINUED | OUTPATIENT
Start: 2021-09-14 | End: 2021-09-17 | Stop reason: HOSPADM

## 2021-09-14 RX ORDER — LORAZEPAM 2 MG/ML
1 INJECTION INTRAMUSCULAR
Status: DISCONTINUED | OUTPATIENT
Start: 2021-09-14 | End: 2021-09-17 | Stop reason: HOSPADM

## 2021-09-14 RX ORDER — METRONIDAZOLE 500 MG/100ML
500 INJECTION, SOLUTION INTRAVENOUS EVERY 6 HOURS
Status: DISCONTINUED | OUTPATIENT
Start: 2021-09-15 | End: 2021-09-15

## 2021-09-14 RX ADMIN — POTASSIUM CHLORIDE, DEXTROSE MONOHYDRATE AND SODIUM CHLORIDE 125 ML/HR: 150; 5; 450 INJECTION, SOLUTION INTRAVENOUS at 19:20

## 2021-09-14 RX ADMIN — METRONIDAZOLE 500 MG: 500 INJECTION, SOLUTION INTRAVENOUS at 19:23

## 2021-09-14 RX ADMIN — SODIUM CHLORIDE 1000 ML: 9 INJECTION, SOLUTION INTRAVENOUS at 17:33

## 2021-09-14 RX ADMIN — LEVOFLOXACIN 750 MG: 5 INJECTION, SOLUTION INTRAVENOUS at 17:33

## 2021-09-14 NOTE — H&P
Surgery Consult    Subjective:      Sydney Homans is a 44 y.o. male who is being seen for evaluation of abdominal pain. The pain is located in the LLQ. Pain is described as sharp and stabbing and measures 6/10 in intensity. Onset of pain was 5 days ago. Aggravating factors include movement, activity and pressure. Alleviating factors include none. Associated symptoms include none. He denies fevers, chills, change in bowel habits, and blood per rectum    He has had 3 previous episodes of diverticulitis     Patient Active Problem List    Diagnosis Date Noted    Diverticular disease of large intestine with complication 07/41/4353     No past medical history on file. No past surgical history on file. Social History     Tobacco Use    Smoking status: Never Smoker    Smokeless tobacco: Never Used   Substance Use Topics    Alcohol use:  Yes     Alcohol/week: 14.0 standard drinks     Types: 14 Cans of beer per week      Family History   Problem Relation Age of Onset    Diabetes Father     Cancer Father       Current Facility-Administered Medications   Medication Dose Route Frequency    levoFLOXacin (LEVAQUIN) 750 mg in D5W IVPB  750 mg IntraVENous NOW    metroNIDAZOLE (FLAGYL) IVPB premix 500 mg  500 mg IntraVENous NOW    sodium chloride 0.9 % bolus infusion 1,000 mL  1,000 mL IntraVENous NOW    aluminum-magnesium hydroxide (MAALOX) oral suspension 15 mL  15 mL Oral QID PRN    dextrose 5% - 0.45% NaCl with KCl 20 mEq/L infusion  125 mL/hr IntraVENous CONTINUOUS    sodium chloride (NS) flush 5-40 mL  5-40 mL IntraVENous Q8H    sodium chloride (NS) flush 5-40 mL  5-40 mL IntraVENous PRN    acetaminophen (TYLENOL) tablet 650 mg  650 mg Oral Q6H PRN    naloxone (NARCAN) injection 0.4 mg  0.4 mg IntraVENous PRN    ondansetron (ZOFRAN) injection 4 mg  4 mg IntraVENous Q4H PRN    diphenhydrAMINE (BENADRYL) injection 25 mg  25 mg IntraVENous Q6H PRN    HYDROmorphone (DILAUDID) injection 0.5 mg  0.5 mg IntraVENous Q2H PRN    [START ON 9/15/2021] levoFLOXacin (LEVAQUIN) 500 mg in D5W IVPB  500 mg IntraVENous Q24H    metroNIDAZOLE (FLAGYL) IVPB premix 500 mg  500 mg IntraVENous Q6H    LORazepam (ATIVAN) injection 1 mg  1 mg IntraVENous Q6H PRN     Current Outpatient Medications   Medication Sig    doxycycline (ADOXA) 50 mg tablet Take 50 mg by mouth two (2) times a day.  butalbital-acetaminophen-caff (FIORICET) -40 mg per capsule Take 1-2 Caps by mouth every four (4) hours as needed for Headache. Max Daily Amount: 6 Caps.  omeprazole (PRILOSEC) 20 mg capsule Take 1 Cap by mouth daily. Allergies   Allergen Reactions    Pcn [Penicillins] Hives       Review of Systems:    A comprehensive review of systems was negative except for that written in the History of Present Illness. Objective:        Visit Vitals  /77 (BP 1 Location: Left upper arm, BP Patient Position: Sitting)   Pulse 83   Temp 98.7 °F (37.1 °C)   Resp 16   Ht 5' 8\" (1.727 m)   Wt 75.4 kg (166 lb 3.6 oz)   SpO2 100%   BMI 25.27 kg/m²       Physical Exam:  GENERAL: alert, cooperative, no distress, appears stated age, LUNG: clear to auscultation bilaterally, HEART: regular rate and rhythm, S1, S2 normal, no murmur, click, rub or gallop, ABDOMEN: soft, tender in LLQ without peritoneal signs . Bowel sounds normal. No masses,  no organomegaly    Imaging:  Report for outside facility examined. Severe acute diverticulitis with multiple punctate bubbles of mesenteric air. No abscess.   No free air    Lab/Data Review:  BMP:   Lab Results   Component Value Date/Time     09/14/2021 03:57 PM    K 3.7 09/14/2021 03:57 PM     09/14/2021 03:57 PM    CO2 29 09/14/2021 03:57 PM    AGAP 6 09/14/2021 03:57 PM    GLU 91 09/14/2021 03:57 PM    BUN 9 09/14/2021 03:57 PM    CREA 0.95 09/14/2021 03:57 PM    GFRAA >60 09/14/2021 03:57 PM    GFRNA >60 09/14/2021 03:57 PM     CBC:   Lab Results   Component Value Date/Time    WBC 12.2 (H) 09/14/2021 03:57 PM    HGB 13.3 09/14/2021 03:57 PM    HCT 40.6 09/14/2021 03:57 PM     09/14/2021 03:57 PM         Assessment:     44year old with severe diverticulitis      Plan:     1.  I recommend proceeding with Conservative therapy:  Intravenous antibiotics and Bowel rest.

## 2021-09-14 NOTE — ED PROVIDER NOTES
EMERGENCY DEPARTMENT HISTORY AND PHYSICAL EXAM      Date: (Not on file)  Patient Name: Francis Guidry    History of Presenting Illness     Chief Complaint   Patient presents with    Abdominal Pain     Patient has diverticulitis and went to [place] and [facility]. His PCP called him and told him that he has a perforation. History Provided By: Patient    HPI: Francis Guidry, 44 y.o. male presents to the ED with cc of abdominal pain. The patient symptoms started 3 days ago. Initially, he had severe left-sided abdominal pain. The pain has been intermittent, lasting for hours at a time. He had a CT performed at [facility] and was called today with an abnormal result. I obtained a copy of the report, which reveals perforated sigmoid diverticulitis. He states that he started antibiotics last night. He denies fever, chills, chest pain, cough or shortness of breath. His pain is currently 3 out of 10 in severity. .  Denies nausea, vomiting or change in bowel habits. There are no other complaints, changes, or physical findings at this time. PCP: Dana Garcia, DO    No current facility-administered medications on file prior to encounter. Current Outpatient Medications on File Prior to Encounter   Medication Sig Dispense Refill    doxycycline (ADOXA) 50 mg tablet Take 50 mg by mouth two (2) times a day.  butalbital-acetaminophen-caff (FIORICET) -40 mg per capsule Take 1-2 Caps by mouth every four (4) hours as needed for Headache. Max Daily Amount: 6 Caps. 20 Cap 0    omeprazole (PRILOSEC) 20 mg capsule Take 1 Cap by mouth daily. 15 Cap 1       Past History     Past Medical History:  Past Medical History:   Diagnosis Date    Diverticulitis        Past Surgical History:  No past surgical history on file.     Family History:  Family History   Problem Relation Age of Onset    Diabetes Father     Cancer Father        Social History:  Social History     Tobacco Use    Smoking status: Never Smoker    Smokeless tobacco: Never Used   Substance Use Topics    Alcohol use: Yes     Alcohol/week: 14.0 standard drinks     Types: 14 Cans of beer per week    Drug use: No       Allergies: Allergies   Allergen Reactions    Pcn [Penicillins] Hives         Review of Systems   Review of Systems   Constitutional: Negative for fever. HENT: Negative for dental problem. Eyes: Negative. Respiratory: Negative for shortness of breath. Cardiovascular: Negative for chest pain. Gastrointestinal: Positive for abdominal pain. Endocrine: Negative for heat intolerance. Genitourinary: Negative. Musculoskeletal: Negative for back pain. Skin: Negative for rash. Allergic/Immunologic: Negative for immunocompromised state. Neurological: Negative for dizziness. Hematological: Does not bruise/bleed easily. Psychiatric/Behavioral: Negative. All other systems reviewed and are negative. Physical Exam   Physical Exam  Vitals and nursing note reviewed. Constitutional:       General: He is not in acute distress. Appearance: He is well-developed. HENT:      Head: Normocephalic and atraumatic. Cardiovascular:      Rate and Rhythm: Normal rate and regular rhythm. Heart sounds: Normal heart sounds. Pulmonary:      Effort: Pulmonary effort is normal.      Breath sounds: Normal breath sounds. Abdominal:      General: Bowel sounds are normal.      Palpations: Abdomen is soft. Tenderness: There is abdominal tenderness in the left lower quadrant. There is guarding. Musculoskeletal:      Cervical back: Normal range of motion. Skin:     General: Skin is warm and dry. Neurological:      General: No focal deficit present. Mental Status: He is alert and oriented to person, place, and time.       Coordination: Coordination normal.   Psychiatric:         Mood and Affect: Mood normal.         Behavior: Behavior normal.         Diagnostic Study Results     Labs -     Recent Results (from the past 12 hour(s))   CBC W/O DIFF    Collection Time: 09/14/21  3:57 PM   Result Value Ref Range    WBC 12.2 (H) 4.1 - 11.1 K/uL    RBC 4.44 4.10 - 5.70 M/uL    HGB 13.3 12.1 - 17.0 g/dL    HCT 40.6 36.6 - 50.3 %    MCV 91.4 80.0 - 99.0 FL    MCH 30.0 26.0 - 34.0 PG    MCHC 32.8 30.0 - 36.5 g/dL    RDW 12.8 11.5 - 14.5 %    PLATELET 179 266 - 214 K/uL    MPV 9.7 8.9 - 12.9 FL    NRBC 0.0 0  WBC    ABSOLUTE NRBC 0.00 0.00 - 3.42 K/uL   METABOLIC PANEL, COMPREHENSIVE    Collection Time: 09/14/21  3:57 PM   Result Value Ref Range    Sodium 136 136 - 145 mmol/L    Potassium 3.7 3.5 - 5.1 mmol/L    Chloride 101 97 - 108 mmol/L    CO2 29 21 - 32 mmol/L    Anion gap 6 5 - 15 mmol/L    Glucose 91 65 - 100 mg/dL    BUN 9 6 - 20 MG/DL    Creatinine 0.95 0.70 - 1.30 MG/DL    BUN/Creatinine ratio 9 (L) 12 - 20      GFR est AA >60 >60 ml/min/1.73m2    GFR est non-AA >60 >60 ml/min/1.73m2    Calcium 8.9 8.5 - 10.1 MG/DL    Bilirubin, total 0.7 0.2 - 1.0 MG/DL    ALT (SGPT) 23 12 - 78 U/L    AST (SGOT) 14 (L) 15 - 37 U/L    Alk.  phosphatase 55 45 - 117 U/L    Protein, total 7.8 6.4 - 8.2 g/dL    Albumin 3.3 (L) 3.5 - 5.0 g/dL    Globulin 4.5 (H) 2.0 - 4.0 g/dL    A-G Ratio 0.7 (L) 1.1 - 2.2     LIPASE    Collection Time: 09/14/21  3:57 PM   Result Value Ref Range    Lipase 61 (L) 73 - 393 U/L   URINALYSIS W/ REFLEX CULTURE    Collection Time: 09/14/21  3:57 PM    Specimen: Urine   Result Value Ref Range    Color YELLOW/STRAW      Appearance CLEAR CLEAR      Specific gravity <1.005 1.003 - 1.030    pH (UA) 5.5 5.0 - 8.0      Protein Negative NEG mg/dL    Glucose Negative NEG mg/dL    Ketone 40 (A) NEG mg/dL    Bilirubin Negative NEG      Blood Negative NEG      Urobilinogen 0.2 0.2 - 1.0 EU/dL    Nitrites Negative NEG      Leukocyte Esterase Negative NEG      WBC 0-4 0 - 4 /hpf    RBC 0-5 0 - 5 /hpf    Epithelial cells FEW FEW /lpf    Bacteria Negative NEG /hpf    UA:UC IF INDICATED CULTURE NOT INDICATED BY UA RESULT CNI         Radiologic Studies -   No orders to display     CT Results  (Last 48 hours)    None        CXR Results  (Last 48 hours)    None          Medical Decision Making   I am the first provider for this patient. I reviewed the vital signs, available nursing notes, past medical history, past surgical history, family history and social history. Vital Signs-Reviewed the patient's vital signs. Patient Vitals for the past 12 hrs:   Temp Pulse Resp BP SpO2   09/14/21 1551 98.7 °F (37.1 °C) 83 16 132/77 100 %           Records Reviewed: Nursing Notes, Old Medical Records and Previous Radiology Studies    Provider Notes (Medical Decision Making):   Perforated diverticulitis    ED Course:   Initial assessment performed. The patients presenting problems have been discussed, and they are in agreement with the care plan formulated and outlined with them. I have encouraged them to ask questions as they arise throughout their visit. Consult note:    I discussed the case with Dr. Zenon Cortes, general surgery. He will admit the patient. Critical Care Time:   CRITICAL CARE NOTE :    5:02 PM    IMPENDING DETERIORATION -Metabolic  ASSOCIATED RISK FACTORS - Hypotension, Metabolic changes and Dehydration  MANAGEMENT- Bedside Assessment and Supervision of Care  INTERPRETATION -  CT Scan and Blood Pressure  INTERVENTIONS - hemodynamic mngmt and Metobolic interventions  CASE REVIEW - Medical Sub-Specialist and Nursing  TREATMENT RESPONSE -Unchanged   PERFORMED BY - Self    NOTES   :  I have spent 30 minutes of critical care time involved in lab review, consultations with specialist, family decision- making, bedside attention and documentation. This time excludes time spent in any separate billed procedures. During this entire length of time I was immediately available to the patient . Deirdre Louis MD      Disposition:  admit    DISCHARGE PLAN:  1. Current Discharge Medication List        2.    Follow-up Information    None       3. Return to ED if worse     Diagnosis     Clinical Impression:   1. Diverticular disease of large intestine with complication        Attestations:    Chloe Orozco MD    Please note that this dictation was completed with [employer], the computer voice recognition software. Quite often unanticipated grammatical, syntax, homophones, and other interpretive errors are inadvertently transcribed by the computer software. Please disregard these errors. Please excuse any errors that have escaped final proofreading. Thank you.

## 2021-09-14 NOTE — ED NOTES
Bedside and Verbal shift change report given to Wiley Ramirez (oncoming nurse) by [facility] RN (offgoing nurse). Report included the following information SBAR, ED Summary, Intake/Output, MAR and Recent Results.

## 2021-09-15 LAB
ANION GAP SERPL CALC-SCNC: 3 MMOL/L (ref 5–15)
BUN SERPL-MCNC: 7 MG/DL (ref 6–20)
BUN/CREAT SERPL: 8 (ref 12–20)
CALCIUM SERPL-MCNC: 8.2 MG/DL (ref 8.5–10.1)
CHLORIDE SERPL-SCNC: 106 MMOL/L (ref 97–108)
CO2 SERPL-SCNC: 28 MMOL/L (ref 21–32)
CREAT SERPL-MCNC: 0.91 MG/DL (ref 0.7–1.3)
ERYTHROCYTE [DISTWIDTH] IN BLOOD BY AUTOMATED COUNT: 12.4 % (ref 11.5–14.5)
GLUCOSE SERPL-MCNC: 115 MG/DL (ref 65–100)
HCT VFR BLD AUTO: 35.6 % (ref 36.6–50.3)
HGB BLD-MCNC: 11.9 G/DL (ref 12.1–17)
MCH RBC QN AUTO: 30.3 PG (ref 26–34)
MCHC RBC AUTO-ENTMCNC: 33.4 G/DL (ref 30–36.5)
MCV RBC AUTO: 90.6 FL (ref 80–99)
NRBC # BLD: 0 K/UL (ref 0–0.01)
NRBC BLD-RTO: 0 PER 100 WBC
PLATELET # BLD AUTO: 222 K/UL (ref 150–400)
PMV BLD AUTO: 10 FL (ref 8.9–12.9)
POTASSIUM SERPL-SCNC: 3.6 MMOL/L (ref 3.5–5.1)
RBC # BLD AUTO: 3.93 M/UL (ref 4.1–5.7)
SODIUM SERPL-SCNC: 137 MMOL/L (ref 136–145)
WBC # BLD AUTO: 10.3 K/UL (ref 4.1–11.1)

## 2021-09-15 PROCEDURE — 74011250636 HC RX REV CODE- 250/636: Performed by: SURGERY

## 2021-09-15 PROCEDURE — 80048 BASIC METABOLIC PNL TOTAL CA: CPT

## 2021-09-15 PROCEDURE — 99231 SBSQ HOSP IP/OBS SF/LOW 25: CPT | Performed by: SURGERY

## 2021-09-15 PROCEDURE — 36415 COLL VENOUS BLD VENIPUNCTURE: CPT

## 2021-09-15 PROCEDURE — 65270000029 HC RM PRIVATE

## 2021-09-15 PROCEDURE — 85027 COMPLETE CBC AUTOMATED: CPT

## 2021-09-15 PROCEDURE — 74011250637 HC RX REV CODE- 250/637: Performed by: SURGERY

## 2021-09-15 RX ORDER — PANTOPRAZOLE SODIUM 40 MG/1
40 TABLET, DELAYED RELEASE ORAL
Status: DISCONTINUED | OUTPATIENT
Start: 2021-09-15 | End: 2021-09-17 | Stop reason: HOSPADM

## 2021-09-15 RX ORDER — METRONIDAZOLE 500 MG/100ML
500 INJECTION, SOLUTION INTRAVENOUS EVERY 12 HOURS
Status: DISCONTINUED | OUTPATIENT
Start: 2021-09-15 | End: 2021-09-17 | Stop reason: HOSPADM

## 2021-09-15 RX ADMIN — POTASSIUM CHLORIDE, DEXTROSE MONOHYDRATE AND SODIUM CHLORIDE 125 ML/HR: 150; 5; 450 INJECTION, SOLUTION INTRAVENOUS at 22:03

## 2021-09-15 RX ADMIN — METRONIDAZOLE 500 MG: 500 INJECTION, SOLUTION INTRAVENOUS at 18:21

## 2021-09-15 RX ADMIN — HYDROMORPHONE HYDROCHLORIDE 0.5 MG: 1 INJECTION, SOLUTION INTRAMUSCULAR; INTRAVENOUS; SUBCUTANEOUS at 02:08

## 2021-09-15 RX ADMIN — POTASSIUM CHLORIDE, DEXTROSE MONOHYDRATE AND SODIUM CHLORIDE 125 ML/HR: 150; 5; 450 INJECTION, SOLUTION INTRAVENOUS at 18:21

## 2021-09-15 RX ADMIN — LEVOFLOXACIN 500 MG: 5 INJECTION, SOLUTION INTRAVENOUS at 08:27

## 2021-09-15 RX ADMIN — PANTOPRAZOLE SODIUM 40 MG: 40 TABLET, DELAYED RELEASE ORAL at 07:30

## 2021-09-15 RX ADMIN — Medication 10 ML: at 13:00

## 2021-09-15 RX ADMIN — METRONIDAZOLE 500 MG: 500 INJECTION, SOLUTION INTRAVENOUS at 06:20

## 2021-09-15 RX ADMIN — ACETAMINOPHEN 650 MG: 325 TABLET ORAL at 12:59

## 2021-09-15 RX ADMIN — Medication 10 ML: at 06:20

## 2021-09-15 NOTE — ED NOTES
Patient is being transferred to \[facility]\"" 2 General Surgery, Room # 2174. Report given to Peter Cruz RN on Gerald Jaime for routine progression of care. Report consisted of the following information SBAR, ED Summary, Procedure Summary, Intake/Output, MAR, Recent Results, Med Rec Status and Cardiac Rhythm nsr. Patient transferred to receiving unit by: MELISSA Timmons RN and [address] [facility],[address], ed tech (RN or tech name). Outstanding consults needed: Yes    Next labs due: Yes    The following personal items will be sent with the patient during transfer to the floor:     All valuables:    Cardiac monitoring ordered: No     The following CURRENT information was reported to the receiving RN:    Code status: Full Code at time of transfer    Last set of vital signs:  Vital Signs  Level of Consciousness: Alert (0) (09/14/21 1551)  Temp: 98.7 °F (37.1 °C) (09/14/21 1551)  Temp Source: Oral (09/14/21 1551)  Pulse (Heart Rate): 79 (09/15/21 0130)  Heart Rate Source: Monitor (09/14/21 1551)  Resp Rate: 18 (09/15/21 0130)  BP: 112/79 (09/15/21 0130)  MAP (Monitor): 87 (09/15/21 0130)  MAP (Calculated): 90 (09/15/21 0130)  BP 1 Location: Left upper arm (09/14/21 1551)  BP 1 Method: Automatic (09/14/21 1551)  BP Patient Position: Sitting (09/14/21 1551)  MEWS Score: 1 (09/14/21 1551)         Oxygen Therapy  O2 Sat (%): 96 % (09/15/21 0130)  Pulse via Oximetry: 78 beats per minute (09/15/21 0130)  O2 Device: None (Room air) (09/14/21 1551)      Last pain assessment:  Pain 1  Pain Scale 1: Numeric (0 - 10)  Pain Intensity 1: 2  Patient Stated Pain Goal: 0  Pain Reassessment 1: Yes  Pain Onset 1: Since Saturday  Pain Location 1: Abdomen  Pain Orientation 1: Right, Left, Lower  Pain Description 1: Intermittent, Aching  Pain Intervention(s) 1: Medication (see MAR), Rest      Wounds: No     Urinary catheter: voiding  Is there a valles order: No     LDAs:       Peripheral IV 09/14/21 Anterior;Proximal;Right Forearm (Active)       Peripheral IV 09/14/21 Anterior;Left;Proximal Forearm (Active)         Opportunity for questions and clarification was provided.     Nini Barney, RN

## 2021-09-15 NOTE — ADT AUTH CERT NOTES
INPATIENT ADMISSION NOTIF   ADMISSION DATE 2021    PATIENT IS STILL IN [place]     UR CONTACT   Abbie Rkp. 18.     [address] [phone]  UR Angeliquewn    [facility]!     Formerly [facility], later [facility]     FACILITY NPI :[id number]       Formerly [facility], later [facility]  [facility] 2 GENERAL SURGERY  [address]  [address] [address] [phone]            Patient Name :Craig Hoover   : 1970 (50 yrs)  MRN : [id number]     Patient Mailing Address [address] [place] [address] [] , [id number]                                                             .         Insurance Plan Payor: BLUE CROSS / Plan: [facility] PPO / Product Type: PPO /      Primary Coverage Subscriber ID : [id number]        Current Patient Class : INPATIENT  Admit Date : 2021     REQUESTED LEVEL OF CARE: INPATIENT [101]                                                           Diagnosis : Diverticular disease of large intestine with complication                          ICD10 Code : Diverticular disease of large intestine with complication [R50.10]          Admitting and Attending Info:  Admitting Provider : Jarett Solares MD   NPI: [id number]  Admitting Provider Phone. [phone]  Admitting Provider Address:SAME AS FACILITY            Patient Demographics    Patient Name   Zheng Torres Legal Sex   Male    1970 Address   [facility] [address] Phone   [phone] [facility] Account    Name Acct ID Class Status Primary Coverage   Zheng Torres [id number] INPATIENT Open BLUE CROSS - [address] [place] [address] PPO          Guarantor Account (for Hospital Account [de-identified])    Name Relation to Pt Service Area Active?  Acct Type   Zheng Torres Self [facility] Yes Personal/Family   [facility]     [facility] [place] [address] [phone](W)            Coverage Information (for Hospital Account [de-identified])    F/O Payor/Plan Subscriber  Subscriber Sex Precert #   [place] [facility] PPO 66 M    Subscriber Subscriber #   Romayne Hillier [id number]   Grp # Group Name   [id number] none   Address Phone   [place]anna N[place] [phone]   [place], [address] [place] [address]    Policy Number Status Effective Date Benefits Phone   [id number] -  -   Auth/Cert   REF# [id number]          Diagnosis     Codes Comments   Diverticular disease of large intestine with complication  NANCIE-43-EO: K57.30   ICD-9-CM: 562.10           Admission Information    Arrival Date/Time: 2021 1542 Admit Date/Time: 2021 1703 IP Adm.  Date/Time: 2021 1753   Admission Type: Emergency Point of Origin: Non-health Care Facility/self Admit Category:    Means of Arrival: Car Primary Service: Surgery Secondary Service: N/A   Transfer Source:  Service Area: [facility] Unit: [facility] 2 GENERAL SURGERY   Admit Provider: Pradip Jacobs MD Attending Provider: María Elena Smith MD Referring Provider:    Admission Information    Attending Provider Admission Dx Admitted on   Pradip Jacobs MD Diverticular disease of large intestine with complication    Service Isolation Code Status   SURGERY  Full Code   Allergies Advance Care Planning    Pcn [Penicillins] Jump to the Activity     Admission Information    Unit/Bed: [facility] 2 GENERAL SURGERY/01 Service: SURGERY   Admitting provider: Pradip Jacobs MD Phone: [phone]   Attending provider: Pradip Jacobs MD Phone: [phone]   PCP: Talha Sequeira DO Phone: [phone]   Admission dx:  Patient class: I   Admission type: ER     Patient Demographics    Patient Name   Kaushik Swanson   [id number] Legal Sex   Male    1970 Address   [place] [address] Phone   [phone] (Home)   [phone] (Mobile)   H&P Notes     H&P by Pradip Jacobs MD at 21 1752 documented on ED to Hosp-Admission (Current) from 9/14/2021 in [facility] 2 GENERAL SURGERY  Author: Rosalinda Wilburn MD Author Type: Physician Filed: 09/14/21 0216   Note Status: Signed Cosign: Cosign Not Required Date of Service: 09/14/21 1754   : Rosalinda Wilburn MD (Physician)         Surgery Consult     Subjective:      Delisa Louis is a 44 y.o. male who is being seen for evaluation of abdominal pain. The pain is located in the LLQ. Pain is described as sharp and stabbing and measures 6/10 in intensity. Onset of pain was 5 days ago. Aggravating factors include movement, activity and pressure. Alleviating factors include none. Associated symptoms include none. He denies fevers, chills, change in bowel habits, and blood per rectum     He has had 3 previous episodes of diverticulitis           Patient Active Problem List     Diagnosis Date Noted    Diverticular disease of large intestine with complication 72/81/4421      No past medical history on file. No past surgical history on file. Social History            Tobacco Use    Smoking status: Never Smoker    Smokeless tobacco: Never Used   Substance Use Topics    Alcohol use:  Yes       Alcohol/week: 14.0 standard drinks       Types: 14 Cans of beer per week            Family History   Problem Relation Age of Onset    Diabetes Father      Cancer Father               Current Facility-Administered Medications   Medication Dose Route Frequency    levoFLOXacin (LEVAQUIN) 750 mg in D5W IVPB  750 mg IntraVENous NOW    metroNIDAZOLE (FLAGYL) IVPB premix 500 mg  500 mg IntraVENous NOW    sodium chloride 0.9 % bolus infusion 1,000 mL  1,000 mL IntraVENous NOW    aluminum-magnesium hydroxide (MAALOX) oral suspension 15 mL  15 mL Oral QID PRN    dextrose 5% - 0.45% NaCl with KCl 20 mEq/L infusion  125 mL/hr IntraVENous CONTINUOUS    sodium chloride (NS) flush 5-40 mL  5-40 mL IntraVENous Q8H    sodium chloride (NS) flush 5-40 mL  5-40 mL IntraVENous PRN  acetaminophen (TYLENOL) tablet 650 mg  650 mg Oral Q6H PRN    naloxone (NARCAN) injection 0.4 mg  0.4 mg IntraVENous PRN    ondansetron (ZOFRAN) injection 4 mg  4 mg IntraVENous Q4H PRN    diphenhydrAMINE (BENADRYL) injection 25 mg  25 mg IntraVENous Q6H PRN    HYDROmorphone (DILAUDID) injection 0.5 mg  0.5 mg IntraVENous Q2H PRN    [START ON 9/15/2021] levoFLOXacin (LEVAQUIN) 500 mg in D5W IVPB  500 mg IntraVENous Q24H    metroNIDAZOLE (FLAGYL) IVPB premix 500 mg  500 mg IntraVENous Q6H    LORazepam (ATIVAN) injection 1 mg  1 mg IntraVENous Q6H PRN           Current Outpatient Medications   Medication Sig    doxycycline (ADOXA) 50 mg tablet Take 50 mg by mouth two (2) times a day.  butalbital-acetaminophen-caff (FIORICET) -40 mg per capsule Take 1-2 Caps by mouth every four (4) hours as needed for Headache. Max Daily Amount: 6 Caps.  omeprazole (PRILOSEC) 20 mg capsule Take 1 Cap by mouth daily. Allergies   Allergen Reactions    Pcn [Penicillins] Hives         Review of Systems:    A comprehensive review of systems was negative except for that written in the History of Present Illness.     Objective:         Visit Vitals  /77 (BP 1 Location: Left upper arm, BP Patient Position: Sitting)   Pulse 83   Temp 98.7 °F (37.1 °C)   Resp 16   Ht 5' 8\" (1.727 m)   Wt 75.4 kg (166 lb 3.6 oz)   SpO2 100%   BMI 25.27 kg/m²         Physical Exam:  GENERAL: alert, cooperative, no distress, appears stated age, LUNG: clear to auscultation bilaterally, HEART: regular rate and rhythm, S1, S2 normal, no murmur, click, rub or gallop, ABDOMEN: soft, tender in LLQ without peritoneal signs . Bowel sounds normal. No masses,  no organomegaly     Imaging:  Report for outside facility examined. Severe acute diverticulitis with multiple punctate bubbles of mesenteric air. No abscess.   No free air     Lab/Data Review:  BMP:         Lab Results   Component Value Date/Time      09/14/2021 03:57 PM   K 3.7 2021 03:57 PM      2021 03:57 PM     CO2 29 2021 03:57 PM     AGAP 6 2021 03:57 PM     GLU 91 2021 03:57 PM     BUN 9 2021 03:57 PM     CREA 0.95 2021 03:57 PM     GFRAA >60 2021 03:57 PM     GFRNA >60 2021 03:57 PM      CBC:         Lab Results   Component Value Date/Time     WBC 12.2 (H) 2021 03:57 PM     HGB 13.3 2021 03:57 PM     HCT 40.6 2021 03:57 PM      2021 03:57 PM            Assessment:      44year old with severe diverticulitis        Plan:      1. I recommend proceeding with Conservative therapy:  Intravenous antibiotics and Bowel rest.          Patient Demographics    Patient Name   Tricia Zarate   [id number] Legal Sex   Male    1970 Address   [place] [address] Phone   [phone] (Home)   [phone] (Mobile)   CSN:   [id number]   [address] [place] [address] Date: Admit Time Room Bed   Sep 14, 2021  5:03 PM 2174 [[id number]] 01 [[id number]]   Attending Providers    Provider Pager From To   Nanetta Cranker, MD  21   Nona Case MD  21    Emergency Contact(s)    Name Relation Home Work [address] [place] [address] [phone]     Utilization Reviews       Diverticulitis, Acute - Care Day 2 (9/15/2021) by Kiersten Roa       Review Entered Review Status   9/15/2021 15:23 Completed      Criteria Review      Care Day: 2 Care Date: 9/15/2021 Level of Care: Inpatient Floor    Guideline Day 2    Clinical Status    (X) * Hemodynamic stability    9/15/2021 15:23:03 EDT by Lyssa Santiago      98.3, 58, 103/68, 18, RA sat 96%    (X) * Pain absent or managed    9/15/2021 15:23:03 EDT by Lyssa Santiago      po and IV pain meds for c/o abd pain-better per pt, but states feels bloated and tight.  No flatus    (X) * Vomiting absent or reduced    9/15/2021 15:23:03 EDT by Lyssa Santiago      no vomiting today    (X) * Stable Hgb/Hct    9/15/2021 15:23:03 EDT by Sri Dhaliwal, Rafia      as doc    Activity    ( ) * Ambulatory    9/15/2021 15:23:03 EDT by Rosalind Gavin      up ad michael    Routes    ( ) * Liquid or advanced diet    9/15/2021 15:23:03 EDT by Radha Velazco with ice chips/sips of water only    (X) IV fluids    9/15/2021 15:23:03 EDT by Richa Grider with 20meq kcl/liter at 125cc/hr    ( ) IV medications    9/15/2021 15:23:03 EDT by Rosalind Gavin      protonix 40mg po qd    Interventions    (X) CBC    9/15/2021 15:23:03 EDT by Rosalind Gavin      hgb 11.9, hct 35.6. AG 3, gluc 115, selma 8.2    Medications    (X) Possible IV antibiotics    9/15/2021 15:23:03 EDT by Rosalind Gavin      flagyl 500mg IV Q12H, levaquin 500mg IV Q24H    (X) Possible analgesics    9/15/2021 15:23:03 EDT by Rosalind Gavin      Dilaudid 0.5mg IV Q2H PRN x1 through 1500, tylenol 650mg po q6H PRN x1    * Milestone   Additional Notes   Assessment/Plan:    Active Problems:     Diverticular disease of large intestine with complication (1/60/5735)       Appears he is developing a ileus/SBO due to the inflammation. Since pain and WBC are better with continue with medical management for now.  If no better in am, will repeat CT Scan. Physical Exam:    Abdomen: soft, distended, tympanic, bowel sounds hypoactive, non-tender            Diverticulitis, Acute - Care Day 1 (9/14/2021) by Kristofer Henao Entered Review Status   9/15/2021 11:41 Completed      Criteria Review      Care Day: 1 Care Date: 9/14/2021 Level of Care: Inpatient Floor    Guideline Day 1    Level Of Care    (X) Floor or ICU    9/15/2021 11:41:33 EDT by Rosalind Gavin      surgical    Clinical Status    (X) * Clinical Indications met    9/15/2021 11:41:33 EDT by Sam Calloway old male w/pmh diverticulitis to ED c/o left sided abd pain x3 days, intermittant, lasting for hours. 98.7, 83, 107/77, 15, RA sat 96%.  75.4kg    (X) Abdominal pain and tenderness    9/15/2021 11:41:33 EDT by Rafia Dc      left sided abd pain as doc. Physical exam notes abd tenderness in LLQ with guarding    Routes    (X) IV fluids    9/15/2021 11:41:33 EDT by Vance Peacock      NS 1000cc IVF bolus x1 and then D5.45NS with 20meq kcl/liter at 125cc/hr    (X) NPO or liquid diet    9/15/2021 11:41:33 EDT by Cathie Abdullahi with sips of water    Interventions    (X) CBC, stool examination, cultures    9/15/2021 11:41:33 EDT by Vance Peacock      WBC 12.2. UA: 40 ketone. ALb 3.3, glob 4.5, ast 14, lipase 61. Bld culture pending    (X) Possible ultrasound, CT scan, contrast enema, or endoscopy    9/15/2021 11:41:33 EDT by Vance Peacock      CT abd done at outside facility notes perforated sigmoid colon per ED MD    (X) Surgical consultation    9/15/2021 11:41:33 EDT by Vance Peacock      Surg consult: Imaging at outside facility reviewed: severe acute diverticulitis with mult punctate bubbles of mesenteric air. No abscess. No free air. Rec conservative treatment: IV abx and bowel rest    Medications    (X) Possible IV antibiotics    9/15/2021 11:41:33 EDT by Vance Peacock      Flagyl 500mg IV x1, Levaquin 750mg IV x1    * Milestone   Additional Notes   Assessment:    Severe diverticulitis      Plan:    I recommend proceeding with Conservative therapy:  Intravenous antibiotics and Bowel rest.       Physical Exam   Vitals and nursing note reviewed. Constitutional:        General: He is not in acute distress.      Appearance: He is well-developed. HENT:       Head: Normocephalic and atraumatic. Cardiovascular:       Rate and Rhythm: Normal rate and regular rhythm.       Heart sounds: Normal heart sounds. Pulmonary:       Effort: Pulmonary effort is normal.       Breath sounds: Normal breath sounds. Abdominal:       General: Bowel sounds are normal.       Palpations: Abdomen is soft.       Tenderness: There is abdominal tenderness in the left lower quadrant. There is guarding. Musculoskeletal:       Cervical back: Normal range of motion. Skin:      General: Skin is warm and dry. Neurological:       General: No focal deficit present.       Mental Status: He is alert and oriented to person, place, and time.       Coordination: Coordination normal.    Psychiatric:          Mood and Affect: Mood normal.          Behavior: Behavior normal.           Diverticulitis, Acute - Clinical Indications for Admission to Inpatient Care by Marc Ayala       Review Entered Review Status   9/15/2021 11:33 Completed      Criteria Review      Clinical Indications for Admission to Inpatient Care    Most Recent : Angel Beltran Most Recent Date: 9/15/2021 11:33:19 EDT    (X) Admission is indicated for  1 or more  of the following  (1) (2) (3) (4) (5) (6):       (X) Significant abnormality on imaging study, including  1 or more  of the following :          (X) Billy Bob or free perforation (eg, free air under the diaphragm with or without contrast extravasation)          [A]          9/15/2021 11:33:19 EDT by Angel Beltran            Perforated sigmoid diverticulitis per CT performed at outside facility per ED MD    Notes:    9/15/2021 11:33:20 EDT by Angel Beltran    Subject: Additional Clinical Information      * 50yr old male with PMH diverticulitis to ED c/o abd pain x3 days, left sided/intermittant/lasting for hours at a time. Per report obtained from CT done at outside facility, perforated sigmoid diverticulitis. Admitted.

## 2021-09-15 NOTE — PROGRESS NOTES
Transition of Care Plan:    RUR: 8% LOW  Disposition: Home w/ f/u appts; lives w/ spouse  Follow up appointments: PCP & specialists as indicated  DME needed: None  Transportation at Discharge: Pt to drive himself vs his wife  George Cervantes or means to access home: Yes       IM Medicare Letter: N/A  Is patient a BCPI-A Bundle: No   If yes, was Bundle Letter given?: N/A    Caregiver Contact: PRADEEP/ Wife- Candelario Hernandez, [phone]  Discharge Caregiver contacted prior to discharge? CM reviewed pt's chart. CM met w/ pt to introduce role & complete initial assessment. Pt confirmed demographic information is up to date. Pt reports living w/ his wife & 2 children in 2 level/ 2 PILO house. Independent w/ ADLs/ IADLs & drives at baseline. Reports no use of DME or home O2. Denies prior hx of rehab or HH. Pt anticipates to drive himself home at d/c pending if pt has surgery; wife to transport as alternative. Will continue to follow. Reason for Admission:  Diverticular disease of large intestine with complication                   RUR Score:       8%           Plan for utilizing home health:      No current needs identified; pt reports his wife is a RN    PCP: First and Last name:  Edgar Javier DO   Name of Practice: [facility]   Are you a current patient: Yes/No: Yes   Approximate date of last visit: 9/13/2021   Can you participate in a virtual visit with your PCP:                     Current Advanced Directive/Advance Care Plan: Full Code  [address] (ACP) Conversation  Date of Conversation: 9/15/2021  Conducted with: Patient with [address]:   PRADEEP/ Wife- Candelario Hernandez, [phone]  No healthcare decision makers have been documented. Click here to complete [address] including selection of the Healthcare Decision Maker Relationship (ie \"Primary\")    Today we discussed [address].  The patient is considering options. Content/Action Overview:   Has NO ACP documents/care preferences - information provided, considering goals and options  Reviewed DNR/DNI and patient elects Full Code (Attempt Resuscitation)    Length of Voluntary ACP Conversation in minutes:  <16 minutes (Non-Billable)    [facility] Management Interventions  PCP Verified by CM: Yes Kalyan Ellsworth.  DO)  Last Visit to PCP: 09/13/21  Palliative Care Criteria Met (RRAT>21 & CHF Dx)?: No  Mode of Transport at Discharge: Self (vs wife- pending surgical intervention)  Transition of Care Consult (CM Consult): Discharge Planning  Discharge Durable Medical Equipment: No  Physical Therapy Consult: No  Occupational Therapy Consult: No  Support Systems: Spouse/Significant Other, Child(susy), Other Family Member(s) (2 levels/ ~2 PILO)  Confirm Follow Up Transport: Self  The Plan for Transition of Care is Related to the Following Treatment Goals : Home w/ f/u appts  Discharge Location  Discharge Placement: Home with family assistance (& f/u appts)    PETER Vicente  Care Management

## 2021-09-15 NOTE — PROGRESS NOTES
TRANSFER - IN REPORT:    Verbal report received from [facility] (name) on Luis Eduardo Martini  being received from ED (unit) for routine progression of care      Report consisted of patients Situation, Background, Assessment and   Recommendations(SBAR). Information from the following report(s) SBAR, Kardex, Intake/Output, MAR and Recent Results was reviewed with the receiving nurse. Opportunity for questions and clarification was provided. Assessment completed upon patients arrival to unit and care assumed. 0230: Pt arrived to unit    End of Shift Note    Bedside shift change report given to Nicholas Watson RN (oncoming nurse) by Aleksandar Truong RN and DONNA Taylor (offgoing nurse). Report included the following information SBAR, Kardex, Intake/Output, MAR and Recent Results    Shift worked:  7p-7a     Shift summary and any significant changes:     Pt complains of pain in lower abdomen, stated \"feels like a belt is tightly wrapped around stomach. \" Pt A/O x4. Up ad michael. Pt is stable. Concerns for physician to address:  none     Zone phone for oncoming shift:   [phone]       Activity:  Activity Level: Up ad michael  Number times ambulated in hallways past shift: 0  Number of times OOB to chair past shift: 0    Cardiac:   Cardiac Monitoring: No           Access:   Current line(s): PIV     Genitourinary:   Urinary status: voiding    Respiratory:   O2 Device: None (Room air)  Chronic home O2 use?: NO  Incentive spirometer at bedside: NO     GI:  Last Bowel Movement Date: 09/14/21  Current diet:  DIET NPO Ice Chips, Sips of Water with Meds  Passing flatus: YES  Tolerating current diet: YES       Pain Management:   Patient states pain is manageable on current regimen: YES    Skin:  Josias Score: 21  Interventions: increase time out of bed    Patient Safety:  Fall Score:  Total Score: 1  Interventions: gripper socks, pt to call before getting OOB and stay with me (per policy)       Length of Stay:  Expected LOS: - - -  Actual LOS: 1      Delma Landa RN

## 2021-09-15 NOTE — PROGRESS NOTES
SURGERY PROGRESS NOTE      Admit Date: 2021    POD * No surgery found *    Procedure: * No surgery found *      Subjective:     Patient states abdominal pain is better but he feels bloated and tight. No flatus       Objective:     Visit Vitals  /73   Pulse 63   Temp 98.1 °F (36.7 °C)   Resp 17   Ht 5' 8\" (1.727 m)   Wt 75.4 kg (166 lb 3.6 oz)   SpO2 96%   BMI 25.27 kg/m²        Temp (24hrs), Av.3 °F (36.8 °C), Min:98.1 °F (36.7 °C), Max:98.7 °F (37.1 °C)      No intake/output data recorded.  1901 - 09/15 0700  In: 2412.5 [I.V.:2412.5]  Out: -     Physical Exam:    General:  alert, cooperative, no distress, appears stated age   Abdomen: soft, distended, tympanic, bowel sounds hypoactive, non-tender           Lab Results   Component Value Date/Time    WBC 10.3 09/15/2021 02:08 AM    HGB 11.9 (L) 09/15/2021 02:08 AM    HCT 35.6 (L) 09/15/2021 02:08 AM    PLATELET 701  02:08 AM    MCV 90.6 09/15/2021 02:08 AM       Assessment:     Active Problems:    Diverticular disease of large intestine with complication ()    Appears he is developing a ileus/SBO due to the inflammation. Since pain and WBC are better with continue with medical management for now. If no better in am, will repeat CT  Scan.     Plan:       Continue present treatment

## 2021-09-16 ENCOUNTER — APPOINTMENT (OUTPATIENT)
Dept: CT IMAGING | Age: 51
DRG: 392 | End: 2021-09-16
Attending: NURSE PRACTITIONER
Payer: COMMERCIAL

## 2021-09-16 LAB
ANION GAP SERPL CALC-SCNC: 2 MMOL/L (ref 5–15)
BUN SERPL-MCNC: 5 MG/DL (ref 6–20)
BUN/CREAT SERPL: 6 (ref 12–20)
CALCIUM SERPL-MCNC: 8.7 MG/DL (ref 8.5–10.1)
CHLORIDE SERPL-SCNC: 110 MMOL/L (ref 97–108)
CO2 SERPL-SCNC: 27 MMOL/L (ref 21–32)
CREAT SERPL-MCNC: 0.77 MG/DL (ref 0.7–1.3)
ERYTHROCYTE [DISTWIDTH] IN BLOOD BY AUTOMATED COUNT: 12.6 % (ref 11.5–14.5)
GLUCOSE SERPL-MCNC: 114 MG/DL (ref 65–100)
HCT VFR BLD AUTO: 38 % (ref 36.6–50.3)
HGB BLD-MCNC: 12.6 G/DL (ref 12.1–17)
MCH RBC QN AUTO: 30 PG (ref 26–34)
MCHC RBC AUTO-ENTMCNC: 33.2 G/DL (ref 30–36.5)
MCV RBC AUTO: 90.5 FL (ref 80–99)
NRBC # BLD: 0 K/UL (ref 0–0.01)
NRBC BLD-RTO: 0 PER 100 WBC
PLATELET # BLD AUTO: 250 K/UL (ref 150–400)
PMV BLD AUTO: 10 FL (ref 8.9–12.9)
POTASSIUM SERPL-SCNC: 3.6 MMOL/L (ref 3.5–5.1)
RBC # BLD AUTO: 4.2 M/UL (ref 4.1–5.7)
SODIUM SERPL-SCNC: 139 MMOL/L (ref 136–145)
WBC # BLD AUTO: 7.3 K/UL (ref 4.1–11.1)

## 2021-09-16 PROCEDURE — 74011250636 HC RX REV CODE- 250/636: Performed by: SURGERY

## 2021-09-16 PROCEDURE — 74011000636 HC RX REV CODE- 636: Performed by: SURGERY

## 2021-09-16 PROCEDURE — 80048 BASIC METABOLIC PNL TOTAL CA: CPT

## 2021-09-16 PROCEDURE — 85027 COMPLETE CBC AUTOMATED: CPT

## 2021-09-16 PROCEDURE — 65270000029 HC RM PRIVATE

## 2021-09-16 PROCEDURE — 36415 COLL VENOUS BLD VENIPUNCTURE: CPT

## 2021-09-16 PROCEDURE — 74011250637 HC RX REV CODE- 250/637: Performed by: SURGERY

## 2021-09-16 PROCEDURE — 74177 CT ABD & PELVIS W/CONTRAST: CPT

## 2021-09-16 PROCEDURE — 99231 SBSQ HOSP IP/OBS SF/LOW 25: CPT | Performed by: SURGERY

## 2021-09-16 RX ADMIN — PANTOPRAZOLE SODIUM 40 MG: 40 TABLET, DELAYED RELEASE ORAL at 09:15

## 2021-09-16 RX ADMIN — Medication 10 ML: at 15:53

## 2021-09-16 RX ADMIN — METRONIDAZOLE 500 MG: 500 INJECTION, SOLUTION INTRAVENOUS at 17:42

## 2021-09-16 RX ADMIN — METRONIDAZOLE 500 MG: 500 INJECTION, SOLUTION INTRAVENOUS at 06:19

## 2021-09-16 RX ADMIN — ACETAMINOPHEN 650 MG: 325 TABLET ORAL at 22:01

## 2021-09-16 RX ADMIN — Medication 10 ML: at 22:02

## 2021-09-16 RX ADMIN — IOPAMIDOL 100 ML: 755 INJECTION, SOLUTION INTRAVENOUS at 11:03

## 2021-09-16 RX ADMIN — LEVOFLOXACIN 500 MG: 5 INJECTION, SOLUTION INTRAVENOUS at 09:14

## 2021-09-16 RX ADMIN — POTASSIUM CHLORIDE, DEXTROSE MONOHYDRATE AND SODIUM CHLORIDE 125 ML/HR: 150; 5; 450 INJECTION, SOLUTION INTRAVENOUS at 06:19

## 2021-09-16 RX ADMIN — IOHEXOL 50 ML: 240 INJECTION, SOLUTION INTRATHECAL; INTRAVASCULAR; INTRAVENOUS; ORAL at 09:14

## 2021-09-16 RX ADMIN — POTASSIUM CHLORIDE, DEXTROSE MONOHYDRATE AND SODIUM CHLORIDE 125 ML/HR: 150; 5; 450 INJECTION, SOLUTION INTRAVENOUS at 17:47

## 2021-09-16 NOTE — PROGRESS NOTES
SURGERY PROGRESS NOTE      Admit Date: 2021      Subjective:     Patient feels better. Less bloating. Passing flatus. abdominal pain much improved. Objective:     Visit Vitals  /67   Pulse 67   Temp 98 °F (36.7 °C)   Resp 18   Ht 5' 8\" (1.727 m)   Wt 75.4 kg (166 lb 3.6 oz)   SpO2 98%   BMI 25.27 kg/m²        Temp (24hrs), Av °F (36.7 °C), Min:97.6 °F (36.4 °C), Max:98.4 °F (36.9 °C)      No intake/output data recorded.  1901 -  0700  In: 5922.9 [I.V.:5922.9]  Out: -     Physical Exam:    General:  alert, cooperative, no distress, appears stated age   Abdomen: soft, less distended bowel sounds active, non-tender           Lab Results   Component Value Date/Time    WBC 7.3 2021 04:04 AM    HGB 12.6 2021 04:04 AM    HCT 38.0 2021 04:04 AM    PLATELET 090  04:04 AM    MCV 90.5 2021 04:04 AM     Lab Results   Component Value Date/Time    GFR est non-AA >60 2021 04:04 AM    GFR est AA >60 2021 04:04 AM    Creatinine 0.77 2021 04:04 AM    BUN 5 (L) 2021 04:04 AM    Sodium 139 2021 04:04 AM    Potassium 3.6 2021 04:04 AM    Chloride 110 (H) 2021 04:04 AM    CO2 27 2021 04:04 AM    PTH, Intact CANCELED 2013 12:00 AM       Assessment:     Active Problems:    Diverticular disease of large intestine with complication ()    appears to be improving.     Plan:       Interval CT to insure improvement  Advance diet

## 2021-09-16 NOTE — PROGRESS NOTES
End of Shift Note    Bedside shift change report given to Parker Kruse RN (oncoming nurse) by Kike Encinas RN (offgoing nurse). Report included the following information SBAR and Kardex    Shift worked:  7a-7p     Shift summary and any significant changes:     Pt had good shift, one complaint of uncomfort, but didn't want anything other than tylenol. Pt has walked the halls many times and say he had a little bit of an appetite this evening. Concerns for physician to address:  none     Zone phone for oncoming shift:   [phone]       Activity:  Activity Level: Up ad michael  Number times ambulated in hallways past shift: 5  Number of times OOB to chair past shift: 0    Cardiac:   Cardiac Monitoring: No           Access:   Current line(s): PIV     Genitourinary:   Urinary status: voiding    Respiratory:   O2 Device: None (Room air)  Chronic home O2 use?: NO  Incentive spirometer at bedside: NO     GI:  Last Bowel Movement Date: 09/14/21  Current diet:  DIET NPO Ice Chips, Sips of Water with Meds  Passing flatus: YES  Tolerating current diet: YES       Pain Management:   Patient states pain is manageable on current regimen: YES    Skin:  Josias Score: 20  Interventions: increase time out of bed and limit briefs    Patient Safety:  Fall Score:  Total Score: 0  Interventions: gripper socks, pt to call before getting OOB and stay with me (per policy)       Length of Stay:  Expected LOS: 2d 14h  Actual LOS: [address], RN

## 2021-09-16 NOTE — PROGRESS NOTES
Bedside and Verbal shift change report given to Yan Dong (oncoming nurse) by Tyler Stanton (offgoing nurse). Report included the following information SBAR, Kardex, Intake/Output, MAR and Recent Results. Patient rested fairly this night awake most of night, no complaints voiced.

## 2021-09-16 NOTE — PROGRESS NOTES
Spiritual Care Partner Volunteer visited patient at [address] in [facility] [address] [facility] on 9/16/2021     Documented by:  [id number] [facility] Beth Saxena,Mona, Elly 605 Provider   Paging Service [phone]

## 2021-09-17 VITALS
HEIGHT: 68 IN | SYSTOLIC BLOOD PRESSURE: 117 MMHG | RESPIRATION RATE: 18 BRPM | DIASTOLIC BLOOD PRESSURE: 74 MMHG | WEIGHT: 166.23 LBS | BODY MASS INDEX: 25.19 KG/M2 | HEART RATE: 64 BPM | TEMPERATURE: 98.1 F | OXYGEN SATURATION: 97 %

## 2021-09-17 LAB
ANION GAP SERPL CALC-SCNC: 3 MMOL/L (ref 5–15)
BUN SERPL-MCNC: 3 MG/DL (ref 6–20)
BUN/CREAT SERPL: 4 (ref 12–20)
CALCIUM SERPL-MCNC: 8.9 MG/DL (ref 8.5–10.1)
CHLORIDE SERPL-SCNC: 110 MMOL/L (ref 97–108)
CO2 SERPL-SCNC: 26 MMOL/L (ref 21–32)
CREAT SERPL-MCNC: 0.76 MG/DL (ref 0.7–1.3)
ERYTHROCYTE [DISTWIDTH] IN BLOOD BY AUTOMATED COUNT: 12.7 % (ref 11.5–14.5)
GLUCOSE SERPL-MCNC: 115 MG/DL (ref 65–100)
HCT VFR BLD AUTO: 36.1 % (ref 36.6–50.3)
HGB BLD-MCNC: 11.9 G/DL (ref 12.1–17)
MCH RBC QN AUTO: 29.8 PG (ref 26–34)
MCHC RBC AUTO-ENTMCNC: 33 G/DL (ref 30–36.5)
MCV RBC AUTO: 90.5 FL (ref 80–99)
NRBC # BLD: 0 K/UL (ref 0–0.01)
NRBC BLD-RTO: 0 PER 100 WBC
PLATELET # BLD AUTO: 278 K/UL (ref 150–400)
PMV BLD AUTO: 10 FL (ref 8.9–12.9)
POTASSIUM SERPL-SCNC: 3.6 MMOL/L (ref 3.5–5.1)
RBC # BLD AUTO: 3.99 M/UL (ref 4.1–5.7)
SODIUM SERPL-SCNC: 139 MMOL/L (ref 136–145)
WBC # BLD AUTO: 7.5 K/UL (ref 4.1–11.1)

## 2021-09-17 PROCEDURE — 85027 COMPLETE CBC AUTOMATED: CPT

## 2021-09-17 PROCEDURE — 80048 BASIC METABOLIC PNL TOTAL CA: CPT

## 2021-09-17 PROCEDURE — 74011250637 HC RX REV CODE- 250/637: Performed by: SURGERY

## 2021-09-17 PROCEDURE — 74011250636 HC RX REV CODE- 250/636: Performed by: SURGERY

## 2021-09-17 PROCEDURE — 36415 COLL VENOUS BLD VENIPUNCTURE: CPT

## 2021-09-17 RX ORDER — METRONIDAZOLE 500 MG/1
500 TABLET ORAL 3 TIMES DAILY
Qty: 30 TABLET | Refills: 0 | Status: SHIPPED | OUTPATIENT
Start: 2021-09-17 | End: 2021-09-27

## 2021-09-17 RX ORDER — CIPROFLOXACIN 500 MG/1
500 TABLET ORAL 2 TIMES DAILY
Qty: 20 TABLET | Refills: 0 | Status: SHIPPED | OUTPATIENT
Start: 2021-09-17 | End: 2021-09-27

## 2021-09-17 RX ADMIN — LEVOFLOXACIN 500 MG: 5 INJECTION, SOLUTION INTRAVENOUS at 08:46

## 2021-09-17 RX ADMIN — METRONIDAZOLE 500 MG: 500 INJECTION, SOLUTION INTRAVENOUS at 06:25

## 2021-09-17 RX ADMIN — PANTOPRAZOLE SODIUM 40 MG: 40 TABLET, DELAYED RELEASE ORAL at 06:25

## 2021-09-17 RX ADMIN — POTASSIUM CHLORIDE, DEXTROSE MONOHYDRATE AND SODIUM CHLORIDE 125 ML/HR: 150; 5; 450 INJECTION, SOLUTION INTRAVENOUS at 03:49

## 2021-09-17 NOTE — INTERDISCIPLINARY ROUNDS
Interdisciplinary Rounds were completed on 09/17/21 for this patient. Rounds included nursing, clinical care leader, pharmacy, and case management. Plan of care discussed. See clinical pathway and/or care plan for interventions and desired outcomes.

## 2021-09-17 NOTE — DISCHARGE SUMMARY
Discharge Summary    Patient ID:  Sidra Felix  [id number]  male  44 y.o.  1970    Admit date: 9/14/2021    Discharge date: 9/17/2021    Admitting Physician: Cesario Tobin MD     Consulting Physician(s):   Treatment Team: Attending Provider: Kathe Lin MD; Consulting Provider: Kathe Lin MD; Utilization Review: Melissa Tavera; Care Manager: Adrien Oneill                         HPI:  Pt is a 44 y.o. male who presents at this time with diverticuliti requiring acute care monitoring and/or treatment. Problem List:   Problem List as of 9/17/2021 Date Reviewed: 3/13/2014        Codes Class Noted - Resolved    Diverticular disease of large intestine with complication NWN-[id number]: L60.14  ICD-9-CM: 562.10  9/14/2021 - Present               Hospital Course:  Patient was managed conservatively with bowel rest, IV abx and serial imaging and improved as expected. On the day of discharge, patient was able to tolerate a diet. Discussed with patient staying an additional night to ensure good tolerance of diet but he opted to go home. Pain Management:  Oxycodone    Transfusions:    Number of units banked PRBCs =   none     Activity: Patient mobilized with nursing and was found to be safe and steady with ambulation. Discharged to: Home    Condition on Discharge: Stable     Discharge instructions:    - Take medications as prescribed  - Diet Regular  - Discharge activity:    - Activity as tolerated    - Ambulate several times a day   - Do not drive if taking opioid pain medications    Allergies: Allergies   Allergen Reactions    Pcn [Penicillins] Hives              -DISCHARGE MEDICATION LIST     Current Discharge Medication List      START taking these medications    Details   ciprofloxacin HCl (CIPRO) 500 mg tablet Take 1 Tablet by mouth two (2) times a day for 10 days.   Qty: 20 Tablet, Refills: 0  Start date: 9/17/2021, End date: 9/27/2021      metroNIDAZOLE (FLAGYL) 500 mg tablet Take 1 Tablet by mouth three (3) times daily for 10 days. Qty: 30 Tablet, Refills: 0  Start date: 9/17/2021, End date: 9/27/2021         STOP taking these medications       omeprazole (PRILOSEC) 20 mg capsule Comments:   Reason for Stopping:            per medical continuation form      -Follow up in office in 2 weeks      Signed:  Rosy Del Valle.  Milad Crabtree  MSN, APRN, FNP-C, [facility]  Surgical Nurse Practitioner    9/17/2021  10:48 AM

## 2021-09-17 NOTE — PROGRESS NOTES
Transition of Care Plan:     RUR:   7% LOW  Disposition: Home w/ f/u appts; lives w/ spouse  Follow up appointments: PCP, Surgery- details in AVS  DME needed: None  Transportation at Discharge: Pt to drive himself  Keys or means to access home: Yes       IM Medicare Letter: N/A  Is patient a BCPI-A Bundle: No              If yes, was Bundle Letter given?: N/A    Caregiver Contact: LNOK/ Wife- Francoise Neely, [phone]  Discharge Caregiver contacted prior to discharge? CM acknowledged d/c.     CM reviewed pt's chart. CM met w/ pt at bedside to review JOHNY plan for d/c re: home w/ f/u appts. Pt verbalized understanding & is agreeable to d/c. Pt reports no questions or concerns for d/c. Pt to drive himself at d/c. F/u appts in AVS.   Pt ready to d/c from CM standpoint. RN notified. Care Management Interventions  PCP Verified by CM: Yes Dayla Single.  DO)  Last Visit to PCP: 09/13/21  Palliative Care Criteria Met (RRAT>21 & CHF Dx)?: No  Mode of Transport at Discharge: [address] [place] [address] Time of Discharge: [address] (CM Consult): Discharge Planning  Discharge Durable Medical Equipment: No  Physical Therapy Consult: No  Occupational Therapy Consult: No  Support Systems: Spouse/Significant Other  Confirm Follow Up Transport: Self  The Plan for Transition of Care is Related to the Following Treatment Goals : Home w/ f/u appts  The Patient and/or Patient Representative was Provided with a Choice of Provider and Agrees with the Discharge Plan?: Yes  Discharge Location  Discharge Placement: Home with family assistance (& f/u appts)    PETER Villalta  Care Management

## 2021-09-17 NOTE — DISCHARGE INSTRUCTIONS
Patient Education        Diverticulitis: Care Instructions  Overview     Diverticulitis occurs when pouches form in the wall of the colon and become inflamed or infected. It can be very painful. Doctors aren't sure what causes diverticulitis. There is no proof that foods such as nuts, seeds, or berries cause it or make it worse. A low-fiber diet may cause the colon to work harder to push stool forward. Pouches may form because of this extra work. It may be hard to think about healthy eating while you're in pain. But as you recover, you might think about how you can use healthy eating for overall better health. Healthy eating may help you avoid future attacks. Follow-up care is a key part of your treatment and safety. Be sure to make and go to all appointments, and call your doctor if you are having problems. It's also a good idea to know your test results and keep a list of the medicines you take. How can you care for yourself at home? · Drink plenty of fluids. If you have kidney, heart, or liver disease and have to limit fluids, talk with your doctor before you increase the amount of fluids you drink. · Stay with liquids or a bland diet (plain rice, bananas, dry toast or crackers, applesauce) until you are feeling better. Then you can return to regular foods and slowly increase the amount of fiber in your diet. · Use a heating pad set on low on your belly to relieve mild cramps and pain. · Get extra rest until you are feeling better. · Be safe with medicines. Read and follow all instructions on the label. ? If the doctor gave you a prescription medicine for pain, take it as prescribed. ? If you are not taking a prescription pain medicine, ask your doctor if you can take an over-the-counter medicine. · If your doctor prescribed antibiotics, take them as directed. Do not stop taking them just because you feel better. You need to take the full course of antibiotics.   · Do not use laxatives or enemas unless your doctor tells you to use them. When should you call for help? Call your doctor now or seek immediate medical care if:    · You have a fever.     · You are vomiting.     · You have new or worse belly pain.     · You cannot pass stools or gas. Watch closely for changes in your health, and be sure to contact your doctor if you have any problems. Where can you learn more? Go to http://www.gray.com/  Enter H901 in the search box to learn more about \"Diverticulitis: Care Instructions. \"  Current as of: February 10, 2021               Content Version: 13.0  © 3858-3810 [employer]. Care instructions adapted under license by [employer]. (which disclaims liability or warranty for this information). If you have questions about a medical condition or this instruction, always ask your healthcare professional. [address] any warranty or liability for your use of this information.

## 2021-09-17 NOTE — PROGRESS NOTES
End of Shift Note    Bedside shift change report given to Dinora Tian RN (oncoming nurse) by Porsche Leonard RN (offgoing nurse). Report included the following information SBAR and Kardex    Shift worked:  7a-7p     Shift summary and any significant changes:     Pt had good shift, no complaints of pain. Pt has walked the halls many times and tolerated his diet well today. Concerns for physician to address:  none     Zone phone for oncoming shift:   [phone]       Activity:  Activity Level: Up ad michael  Number times ambulated in hallways past shift: 5  Number of times OOB to chair past shift: 0    Cardiac:   Cardiac Monitoring: No           Access:   Current line(s): PIV     Genitourinary:   Urinary status: voiding    Respiratory:   O2 Device: None (Room air)  Chronic home O2 use?: NO  Incentive spirometer at bedside: NO     GI:  Last Bowel Movement Date: 09/14/21  Current diet:  ADULT DIET Full Liquid  Passing flatus: YES  Tolerating current diet: YES       Pain Management:   Patient states pain is manageable on current regimen: YES    Skin:  Josias Score: 22  Interventions: increase time out of bed and limit briefs    Patient Safety:  Fall Score:  Total Score: 1  Interventions: gripper socks, pt to call before getting OOB and stay with me (per policy)       Length of Stay:  Expected LOS: 2d 14h  Actual LOS: 2      Marisol Welsh RN

## 2021-09-17 NOTE — PROGRESS NOTES
End of Shift Note    Bedside shift change report given to *** (oncoming nurse) by Melissa Bravo RN (offgoing nurse). Report included the following information {SBAR REPORTS MQIA:93020}    Shift worked:  ***     Shift summary and any significant changes:     ***     Concerns for physician to address:  ***     Zone phone for oncoming shift:   ***       Activity:  Activity Level: Up ad michael  Number times ambulated in hallways past shift: {Numbers; 0-5:285423}  Number of times OOB to chair past shift: {Numbers; 0-5:480300}    Cardiac:   Cardiac Monitoring: {YES/NO:12164}           Access:   Current line(s): {access:57194}     Genitourinary:   Urinary status: {:26440}    Respiratory:   O2 Device: None (Room air)  Chronic home O2 use?: {YES/NO/NA:39409}  Incentive spirometer at bedside: {YES/NO/NA:26682}     GI:  Last Bowel Movement Date: 09/14/21  Current diet:  ADULT DIET Regular; Low Fiber  Passing flatus: {YES/NO:03005}  Tolerating current diet: {YES/NO:26826}       Pain Management:   Patient states pain is manageable on current regimen: {YES/NO/NA:05920}    Skin:  Josias Score: 22  Interventions: {josias interventions:81830}    Patient Safety:  Fall Score:  Total Score: 1  Interventions: {fall interventions:80323}       Length of Stay:  Expected LOS: 2d 14h  Actual LOS: 3      Melissa Bravo RN

## 2021-09-19 LAB
BACTERIA SPEC CULT: NORMAL
SERVICE CMNT-IMP: NORMAL

## 2021-09-20 ENCOUNTER — TELEPHONE (OUTPATIENT)
Dept: SURGERY | Age: 51
End: 2021-09-20

## 2021-09-20 NOTE — TELEPHONE ENCOUNTER
Informed patient Dr. Chad Stokes is not on call I could relay message to Dr. Bree Maloney. Having abdominal pain D/C on 9/17/20 diagnosis diverticulitis. Informed patient if sharp pain continues report to ER. Express understanding.

## 2021-09-27 NOTE — PROGRESS NOTES
Patient is a 47-year-old male, earlier September 2021 admitted by Dr. Herve Ramos for uncomplicated diverticulitis apparently several episodes previously, he scheduled surgical follow-up. Was discharged on Cipro and Flagyl    SUBJECTIVE: Nate Blackwood is a 44 y.o. male is seen for a routine postop check. Reports no problems with the wound or other issues. Activity, diet and bowels are normal.  Minimal abdominal pain suprapubic but only 2 out of 10 compared to when he was in the hospital    OBJECTIVE: Appears well. Neuro C A and O x3  CV-  Reg rate  Lungs:  CTA  Abd  soft  NT      ASSESSMENT: normal postdischarge course, doing well. Patient has an appointment with Dr. Violetta Velazco colorectal surgery, as his daughter had seen him before for her sigmoid resection    PLAN: Patient is instructed to ad michael activity . Return PRN for any problems or concerns. Patient has completed his antibiotics otherwise feels well probably will need further colonoscopy before any elective surgery he will talk with colorectal surgery above about possible elective sigmoid colectomy and we reviewed benefits risks alternatives.   No indication for further imaging at this point other than possible colonoscopy in 6 weeks

## 2021-09-30 ENCOUNTER — OFFICE VISIT (OUTPATIENT)
Dept: SURGERY | Age: 51
End: 2021-09-30
Payer: COMMERCIAL

## 2021-09-30 VITALS
OXYGEN SATURATION: 99 % | RESPIRATION RATE: 14 BRPM | BODY MASS INDEX: 25.31 KG/M2 | TEMPERATURE: 97.5 F | SYSTOLIC BLOOD PRESSURE: 113 MMHG | HEART RATE: 53 BPM | DIASTOLIC BLOOD PRESSURE: 70 MMHG | HEIGHT: 68 IN | WEIGHT: 167 LBS

## 2021-09-30 DIAGNOSIS — K57.30 DIVERTICULAR DISEASE OF LARGE INTESTINE WITH COMPLICATION: Primary | ICD-10-CM

## 2021-09-30 PROCEDURE — 99212 OFFICE O/P EST SF 10 MIN: CPT | Performed by: SURGERY

## 2021-09-30 NOTE — PROGRESS NOTES
Chief Complaint   Patient presents with   [facility]     1. Have you been to the ER, urgent care clinic since your last visit? Hospitalized since your last visit? Yes When: [address]    2. Have you seen or consulted any other health care providers outside of the [address] [place] [address] since your last visit? Include any pap smears or colon screening.  No     Visit Vitals  /70 (BP 1 Location: Left arm, BP Patient Position: Sitting, BP Cuff Size: Adult)   Pulse (!) 53   Temp 97.5 °F (36.4 °C) (Oral)   Resp 14   Ht 5' 8\" (1.727 m)   Wt 167 lb (75.8 kg)   SpO2 99%   BMI 25.39 kg/m²

## 2022-03-02 ENCOUNTER — HOSPITAL ENCOUNTER (OUTPATIENT)
Dept: PREADMISSION TESTING | Age: 52
Discharge: HOME | End: 2022-03-02
Attending: SURGERY
Payer: COMMERCIAL

## 2022-03-02 ENCOUNTER — HOSPITAL ENCOUNTER (OUTPATIENT)
Dept: GENERAL RADIOLOGY | Age: 52
Discharge: HOME | End: 2022-03-02
Attending: SURGERY
Payer: COMMERCIAL

## 2022-03-02 VITALS
SYSTOLIC BLOOD PRESSURE: 114 MMHG | WEIGHT: 167.77 LBS | DIASTOLIC BLOOD PRESSURE: 61 MMHG | TEMPERATURE: 97.9 F | BODY MASS INDEX: 25.43 KG/M2 | HEART RATE: 63 BPM | OXYGEN SATURATION: 100 % | HEIGHT: 68 IN | RESPIRATION RATE: 16 BRPM

## 2022-03-02 LAB
ABO + RH BLD: NORMAL
ALBUMIN SERPL-MCNC: 3.9 G/DL (ref 3.5–5)
ALBUMIN/GLOB SERPL: 1 {RATIO} (ref 1.1–2.2)
ALP SERPL-CCNC: 48 U/L (ref 45–117)
ALT SERPL-CCNC: 45 U/L (ref 12–78)
ANION GAP SERPL CALC-SCNC: 4 MMOL/L (ref 5–15)
APPEARANCE UR: CLEAR
APTT PPP: 26.5 SEC (ref 22.1–31)
AST SERPL-CCNC: 17 U/L (ref 15–37)
ATRIAL RATE: 61 BPM
BACTERIA URNS QL MICRO: NEGATIVE /HPF
BILIRUB SERPL-MCNC: 0.3 MG/DL (ref 0.2–1)
BILIRUB UR QL: NEGATIVE
BLOOD GROUP ANTIBODIES SERPL: NORMAL
BUN SERPL-MCNC: 16 MG/DL (ref 6–20)
BUN/CREAT SERPL: 15 (ref 12–20)
CALCIUM SERPL-MCNC: 9 MG/DL (ref 8.5–10.1)
CALCULATED P AXIS, ECG09: 7 DEGREES
CALCULATED R AXIS, ECG10: -4 DEGREES
CALCULATED T AXIS, ECG11: 23 DEGREES
CHLORIDE SERPL-SCNC: 102 MMOL/L (ref 97–108)
CO2 SERPL-SCNC: 29 MMOL/L (ref 21–32)
COLOR UR: NORMAL
CREAT SERPL-MCNC: 1.09 MG/DL (ref 0.7–1.3)
DIAGNOSIS, 93000: NORMAL
EPITH CASTS URNS QL MICRO: NORMAL /LPF
ERYTHROCYTE [DISTWIDTH] IN BLOOD BY AUTOMATED COUNT: 12.5 % (ref 11.5–14.5)
EST. AVERAGE GLUCOSE BLD GHB EST-MCNC: 123 MG/DL
GLOBULIN SER CALC-MCNC: 3.8 G/DL (ref 2–4)
GLUCOSE SERPL-MCNC: 109 MG/DL (ref 65–100)
GLUCOSE UR STRIP.AUTO-MCNC: NEGATIVE MG/DL
HBA1C MFR BLD: 5.9 % (ref 4–5.6)
HCT VFR BLD AUTO: 43.6 % (ref 36.6–50.3)
HGB BLD-MCNC: 14.2 G/DL (ref 12.1–17)
HGB UR QL STRIP: NEGATIVE
INR PPP: 1 (ref 0.9–1.1)
KETONES UR QL STRIP.AUTO: NEGATIVE MG/DL
LEUKOCYTE ESTERASE UR QL STRIP.AUTO: NEGATIVE
MAGNESIUM SERPL-MCNC: 2.4 MG/DL (ref 1.6–2.4)
MCH RBC QN AUTO: 29.9 PG (ref 26–34)
MCHC RBC AUTO-ENTMCNC: 32.6 G/DL (ref 30–36.5)
MCV RBC AUTO: 91.8 FL (ref 80–99)
NITRITE UR QL STRIP.AUTO: NEGATIVE
NRBC # BLD: 0 K/UL (ref 0–0.01)
NRBC BLD-RTO: 0 PER 100 WBC
P-R INTERVAL, ECG05: 170 MS
PH UR STRIP: 6 [PH] (ref 5–8)
PHOSPHATE SERPL-MCNC: 3.8 MG/DL (ref 2.6–4.7)
PLATELET # BLD AUTO: 299 K/UL (ref 150–400)
PMV BLD AUTO: 9.9 FL (ref 8.9–12.9)
POTASSIUM SERPL-SCNC: 4 MMOL/L (ref 3.5–5.1)
PROT SERPL-MCNC: 7.7 G/DL (ref 6.4–8.2)
PROT UR STRIP-MCNC: NEGATIVE MG/DL
PROTHROMBIN TIME: 10.3 SEC (ref 9–11.1)
Q-T INTERVAL, ECG07: 418 MS
QRS DURATION, ECG06: 108 MS
QTC CALCULATION (BEZET), ECG08: 420 MS
RBC # BLD AUTO: 4.75 M/UL (ref 4.1–5.7)
RBC #/AREA URNS HPF: NORMAL /HPF (ref 0–5)
SODIUM SERPL-SCNC: 135 MMOL/L (ref 136–145)
SP GR UR REFRACTOMETRY: 1.01 (ref 1–1.03)
SPECIMEN EXP DATE BLD: NORMAL
THERAPEUTIC RANGE,PTTT: NORMAL SECS (ref 58–77)
UA: UC IF INDICATED,UAUC: NORMAL
UROBILINOGEN UR QL STRIP.AUTO: 0.2 EU/DL (ref 0.2–1)
VENTRICULAR RATE, ECG03: 61 BPM
WBC # BLD AUTO: 8.7 K/UL (ref 4.1–11.1)
WBC URNS QL MICRO: NORMAL /HPF (ref 0–4)

## 2022-03-02 PROCEDURE — 85730 THROMBOPLASTIN TIME PARTIAL: CPT

## 2022-03-02 PROCEDURE — 93005 ELECTROCARDIOGRAM TRACING: CPT

## 2022-03-02 PROCEDURE — 71046 X-RAY EXAM CHEST 2 VIEWS: CPT

## 2022-03-02 PROCEDURE — 86900 BLOOD TYPING SEROLOGIC ABO: CPT

## 2022-03-02 PROCEDURE — 80053 COMPREHEN METABOLIC PANEL: CPT

## 2022-03-02 PROCEDURE — 83735 ASSAY OF MAGNESIUM: CPT

## 2022-03-02 PROCEDURE — 84100 ASSAY OF PHOSPHORUS: CPT

## 2022-03-02 PROCEDURE — 83036 HEMOGLOBIN GLYCOSYLATED A1C: CPT

## 2022-03-02 PROCEDURE — 81001 URINALYSIS AUTO W/SCOPE: CPT

## 2022-03-02 PROCEDURE — 85027 COMPLETE CBC AUTOMATED: CPT

## 2022-03-02 PROCEDURE — 85610 PROTHROMBIN TIME: CPT

## 2022-03-02 PROCEDURE — 36415 COLL VENOUS BLD VENIPUNCTURE: CPT

## 2022-03-02 RX ORDER — SODIUM CHLORIDE, SODIUM LACTATE, POTASSIUM CHLORIDE, CALCIUM CHLORIDE 600; 310; 30; 20 MG/100ML; MG/100ML; MG/100ML; MG/100ML
25 INJECTION, SOLUTION INTRAVENOUS CONTINUOUS
Status: CANCELLED | OUTPATIENT
Start: 2022-03-08

## 2022-03-02 NOTE — PERIOP NOTES
[facility]  Preoperative Instructions        Surgery Date 3/8/2022          Time of Arrival TBD  Contact # [phone]    1. On the day of your surgery, please report to the Surgical Services Registration Desk and sign in at your designated time. The Surgery Center is located to the right of the Emergency Room. 2. You must have someone with you to drive you home. You should not drive a car for 24 hours following surgery. Please make arrangements for a friend or family member to stay with you for the first 24 hours after your surgery. 3. Refer to your handbook for instructions on drinking liquids prior to surgery. 4. We recommend you do not drink any alcoholic beverages for 24 hours before and after your surgery. 5. Contact your surgeons office for instructions on the following medications: non-steroidal anti-inflammatory drugs (i.e. Advil, Aleve), vitamins, and supplements. (Some surgeons will want you to stop these medications prior to surgery and others may allow you to take them)  **If you are currently taking Plavix, Coumadin, Aspirin and/or other blood-thinning agents, contact your surgeon for instructions. ** Your surgeon will partner with the physician prescribing these medications to determine if it is safe to stop or if you need to continue taking. Please do not stop taking these medications without instructions from your surgeon    6. Wear comfortable clothes. Wear glasses instead of contacts. Do not bring any money or jewelry. Please bring picture ID, insurance card, and any prearranged co-payment or hospital payment. Do not wear make-up, particularly mascara the morning of your surgery. Do not wear nail polish, particularly if you are having foot /hand surgery. Wear your hair loose or down, no ponytails, buns, billy pins or clips. All body piercings must be removed.   Please shower with antibacterial soap for three consecutive days before and on the morning of surgery, but do not apply any lotions, powders or deodorants after the shower on the day of surgery. Please use a fresh towels after each shower. Please sleep in clean clothes and change bed linens the night before surgery. Please do not shave for 48 hours prior to surgery. Shaving of the face is acceptable. 7. You should understand that if you do not follow these instructions your surgery may be cancelled. If your physical condition changes (I.e. fever, cold or flu) please contact your surgeon as soon as possible. 8. It is important that you be on time. If a situation occurs where you may be late, please call [phone] (OR Holding Area). 9. If you have any questions and or problems, please call [phone] (Pre-admission Testing). 10. Your surgery time may be subject to change. You will receive a phone call the evening prior if your time changes. 11.  If having outpatient surgery, you must have someone to drive you here, stay with you during the duration of your stay, and to drive you home at time of discharge. 12.   In an effort to improve the efficiency, privacy, and safety for all of our Pre-op patients visitors are not allowed in the Holding area. Once you arrive and are registered your family/visitors will be asked to remain in your vehicle. The Pre-op staff will call you when they are ready for you to enter the building and will explain to you and your family/visitors that the Pre-op phase is beginning. At this time, if your procedure is outpatient, your family member will be asked to stay in their vehicle until the surgery is complete and you are ready for discharge. If you are going to be admitted after your surgery, once you are called to come inside the building, your family will be able to leave the parking lot.    At this time the staff will also ask for your designated spokesperson information in the event that the physician or staff need to provide an update or obtain any pertinent information. The designated spokesperson will be notified if the physician needs to speak to family during the pre-operative phase.and/or in the post op phase. 13.   Due to current COVID restrictions, only ONE adult may accompany you the day of the procedure. We have limited seating available. If our waiting room is at capacity, your ride may be asked to remain in their vehicle. No children are allowed in the waiting room     Special Instructions:   [facility]noon on Friday, 3/4/2022; patient was instructed to self quarantine after test through day of surgery/procedure. TAKE ALL MEDICATIONS THE DAY OF SURGERY EXCEPT: none      I understand a pre-operative phone call will be made to verify my surgery time. In the event that I am not available, I give permission for a message to be left on my answering service and/or with another person?   yes         ___________________        __________   3/2/2022 @ 0250    (Signature of Patient)             (Witness)                (Date and Time)

## 2022-03-02 NOTE — PERIOP NOTES
Incentive Spirometer        Using the incentive spirometer helps expand the small air sacs of your lungs, helps you breathe deeply, and helps improve your lung function. Use your incentive spirometer twice a day (10 breaths each time) prior to surgery. How to Use Your Incentive Spirometer:  1. Hold the incentive spirometer in an upright position. 2. Breathe out as usual.   3. Place the mouthpiece in your mouth and seal your lips tightly around it. 4. Take a deep breath. Breathe in slowly and as deeply as possible. Keep the blue flow rate guide between the arrows. 5. Hold your breath as long as possible. Then exhale slowly and allow the piston to fall to the bottom of the column. 6. Rest for a few seconds and repeat steps one through five at least 10 times. PAT Tidal Volume_______1500_________  x____2____________  Date___3/2/2022_______    Mildred Quinn THE INCENTIVE SPIROMETER WITH YOU TO THE HOSPITAL ON THE DAY OF YOUR SURGERY. Opportunity given to ask and answer questions as well as to observe return demonstration.     Patient signature_____________________________          Witness____________________________

## 2022-03-02 NOTE — PERIOP NOTES
The [employer] book was reviewed with the patient during the pre-admission testing (PAT) appointment. An opportunity for questions was provided, patient verbalized understanding.      Prep - SUPREP  Rx - Flagyl, Neomycin, Reglan

## 2022-03-02 NOTE — PERIOP NOTES
Hibiclens/Chlorhexidine    Preventing Infections Before and After - Your Surgery    IMPORTANT INSTRUCTIONS    Please read and follow these instructions carefully. If you are unable to comply with the below instructions your procedure will be cancelled. Every Night for Three (3) nights before your surgery:  1. Shower with an antibacterial soap, such as Dial, or the soap provided at your preassessment appointment. A shower is better than a bath for cleaning your skin. 2. If needed, ask someone to help you reach all areas of your body. Dont forget to clean your belly button with every shower. The night before your surgery: If you lose your Hibiclens/chlorhexidine please contact surgery center or you can purchase it at a local pharmacy  1. On the night before your surgery, shower with an antibacterial soap, such as Dial, or the soap provided at your preassessment appointment. 2. With one packet of Hibiclens/Chlorhexidine in hand, turn water off.  3. Apply Hibiclens antiseptic skin cleanser with a clean, freshly washed washcloth. ? Gently apply to your body from chin to toes (except the genital area) and especially the area(s) where your incision(s) will be. ? Leave Hibiclens/Chlorhexidine on your skin for at least 20 seconds. CAUTION: If needed, Hibiclens/chlorhexidine may be used to clean the folds of skin of the legs (such as in the area of the groin) and on your buttocks and hips. However, do not use Hibiclens/Chlorhexidine above the neck or in the genital area (your bottom) or put inside any area of your body. 4. Turn the water back on and rinse. 5. Dry gently with a clean, freshly washed towel. 6. After your shower, do not use any powder, deodorant, perfumes or lotion. 7. Use clean, freshly washed towels and washcloths every time you shower. 8. Wear clean, freshly washed pajamas to bed the night before surgery. 9. Sleep on clean, freshly washed sheets.   10. Do not allow pets to sleep in your bed with you. The Morning of your surgery:  1. Shower again thoroughly with an antibacterial soap, such as Dial or the soap provided at your preassessment appointment. If needed, ask someone for help to reach all areas of your body. Dont forget to clean your belly button! Rinse. 2. Dry gently with a clean, freshly washed towel. 3. After your shower, do not use any powder, deodorant, perfumes or lotion prior to surgery. 4. Put on clean, freshly washed clothing. Tips to help prevent infections after your surgery:  1. Protect your surgical wound from germs:  ? Hand washing is the most important thing you and your caregivers can do to prevent infections. ? Keep your bandage clean and dry! ? Do not touch your surgical wound. 2. Use clean, freshly washed towels and washcloths every time you shower; do not share bath linens with others. 3. Until your surgical wound is healed, wear clothing and sleep on bed linens each day that are clean and freshly washed. 4. Do not allow pets to sleep in your bed with you or touch your surgical wound. 5. Do not smoke - smoking delays wound healing. This may be a good time to stop smoking. 6. If you have diabetes, it is important for you to manage your blood sugar levels properly before your surgery as well as after your surgery. Poorly managed blood sugar levels slow down wound healing and prevent you from healing completely. If you lose your Hibiclens/chlorhexidine, please call the [facility], or it is available for purchase at your pharmacy.                ___________________      ___________________      3/2/2022 @ 1799  (Signature of Patient)          (Witness)                   (Date and Time)

## 2022-03-03 LAB
BACTERIA SPEC CULT: NORMAL
BACTERIA SPEC CULT: NORMAL
SERVICE CMNT-IMP: NORMAL

## 2022-03-04 ENCOUNTER — HOSPITAL ENCOUNTER (OUTPATIENT)
Dept: PREADMISSION TESTING | Age: 52
Discharge: HOME | End: 2022-03-04
Payer: COMMERCIAL

## 2022-03-04 PROCEDURE — U0005 INFEC AGEN DETEC AMPLI PROBE: HCPCS

## 2022-03-05 LAB
SARS-COV-2, XPLCVT: NOT DETECTED
SOURCE, COVRS: NORMAL

## 2022-03-07 RX ORDER — SODIUM CHLORIDE, SODIUM LACTATE, POTASSIUM CHLORIDE, CALCIUM CHLORIDE 600; 310; 30; 20 MG/100ML; MG/100ML; MG/100ML; MG/100ML
75 INJECTION, SOLUTION INTRAVENOUS CONTINUOUS
Status: CANCELLED | OUTPATIENT
Start: 2022-03-08 | End: 2022-03-08

## 2022-03-07 RX ORDER — LEVOFLOXACIN 5 MG/ML
500 INJECTION, SOLUTION INTRAVENOUS ONCE
Status: CANCELLED | OUTPATIENT
Start: 2022-03-08 | End: 2022-03-08

## 2022-03-07 RX ORDER — GABAPENTIN 300 MG/1
600 CAPSULE ORAL ONCE
Status: CANCELLED | OUTPATIENT
Start: 2022-03-08 | End: 2022-03-08

## 2022-03-07 RX ORDER — METRONIDAZOLE 500 MG/100ML
500 INJECTION, SOLUTION INTRAVENOUS ONCE
Status: CANCELLED | OUTPATIENT
Start: 2022-03-08 | End: 2022-03-08

## 2022-03-07 RX ORDER — CELECOXIB 200 MG/1
200 CAPSULE ORAL ONCE
Status: CANCELLED | OUTPATIENT
Start: 2022-03-08 | End: 2022-03-08

## 2022-03-07 RX ORDER — ACETAMINOPHEN 500 MG
1000 TABLET ORAL ONCE
Status: CANCELLED | OUTPATIENT
Start: 2022-03-08 | End: 2022-03-08

## 2022-03-08 ENCOUNTER — HOSPITAL ENCOUNTER (INPATIENT)
Age: 52
LOS: 3 days | Discharge: HOME | DRG: 331 | End: 2022-03-11
Attending: SURGERY | Admitting: SURGERY
Payer: COMMERCIAL

## 2022-03-08 ENCOUNTER — ANESTHESIA EVENT (OUTPATIENT)
Dept: SURGERY | Age: 52
DRG: 331 | End: 2022-03-08
Payer: COMMERCIAL

## 2022-03-08 ENCOUNTER — ANESTHESIA (OUTPATIENT)
Dept: SURGERY | Age: 52
DRG: 331 | End: 2022-03-08
Payer: COMMERCIAL

## 2022-03-08 DIAGNOSIS — K57.30 DIVERTICULAR DISEASE OF LARGE INTESTINE WITH COMPLICATION: Primary | ICD-10-CM

## 2022-03-08 PROBLEM — K57.92 DIVERTICULITIS: Status: ACTIVE | Noted: 2022-03-08

## 2022-03-08 LAB
HCT VFR BLD AUTO: 40.3 % (ref 36.6–50.3)
HGB BLD-MCNC: 13.7 G/DL (ref 12.1–17)

## 2022-03-08 PROCEDURE — 74011250636 HC RX REV CODE- 250/636: Performed by: SURGERY

## 2022-03-08 PROCEDURE — 77030026438 HC STYL ET INTUB CARD -A: Performed by: ANESTHESIOLOGY

## 2022-03-08 PROCEDURE — 77030012961 HC IRR KT CYSTO/TUR ICUM -A: Performed by: SURGERY

## 2022-03-08 PROCEDURE — 2709999900 HC NON-CHARGEABLE SUPPLY: Performed by: SURGERY

## 2022-03-08 PROCEDURE — 77030018673: Performed by: SURGERY

## 2022-03-08 PROCEDURE — 77030008684 HC TU ET CUF COVD -B: Performed by: ANESTHESIOLOGY

## 2022-03-08 PROCEDURE — 74011250636 HC RX REV CODE- 250/636: Performed by: NURSE ANESTHETIST, CERTIFIED REGISTERED

## 2022-03-08 PROCEDURE — 77030008756 HC TU IRR SUC STRY -B: Performed by: SURGERY

## 2022-03-08 PROCEDURE — 77030013079 HC BLNKT BAIR HGGR 3M -A: Performed by: ANESTHESIOLOGY

## 2022-03-08 PROCEDURE — 74011000250 HC RX REV CODE- 250: Performed by: NURSE ANESTHETIST, CERTIFIED REGISTERED

## 2022-03-08 PROCEDURE — 77030008606 HC TRCR ENDOSC KII AMR -B: Performed by: SURGERY

## 2022-03-08 PROCEDURE — 77030002966 HC SUT PDS J&J -A: Performed by: SURGERY

## 2022-03-08 PROCEDURE — 0T788DZ DILATION OF BILATERAL URETERS WITH INTRALUMINAL DEVICE, VIA NATURAL OR ARTIFICIAL OPENING ENDOSCOPIC: ICD-10-PCS | Performed by: UROLOGY

## 2022-03-08 PROCEDURE — 77030002933 HC SUT MCRYL J&J -A: Performed by: SURGERY

## 2022-03-08 PROCEDURE — 88305 TISSUE EXAM BY PATHOLOGIST: CPT

## 2022-03-08 PROCEDURE — 74011250636 HC RX REV CODE- 250/636: Performed by: ANESTHESIOLOGY

## 2022-03-08 PROCEDURE — 2709999900 HC NON-CHARGEABLE SUPPLY

## 2022-03-08 PROCEDURE — 77030035279 HC SEAL VSL ENDOWR XI INTU -I2: Performed by: SURGERY

## 2022-03-08 PROCEDURE — 77030010285 HC STPLR INT PRSTRNG COVD -B: Performed by: SURGERY

## 2022-03-08 PROCEDURE — 74011000254 HC RX REV CODE- 254: Performed by: NURSE ANESTHETIST, CERTIFIED REGISTERED

## 2022-03-08 PROCEDURE — 77030031139 HC SUT VCRL2 J&J -A: Performed by: SURGERY

## 2022-03-08 PROCEDURE — 77030035277 HC OBTRTR BLDELSS DISP INTU -B: Performed by: SURGERY

## 2022-03-08 PROCEDURE — 77030005513 HC CATH URETH FOL11 MDII -B: Performed by: SURGERY

## 2022-03-08 PROCEDURE — 85018 HEMOGLOBIN: CPT

## 2022-03-08 PROCEDURE — 77030034667 HC ACC PLATFRM ENDO GELPNT AMR -E: Performed by: SURGERY

## 2022-03-08 PROCEDURE — 65270000029 HC RM PRIVATE

## 2022-03-08 PROCEDURE — 0TJB8ZZ INSPECTION OF BLADDER, VIA NATURAL OR ARTIFICIAL OPENING ENDOSCOPIC: ICD-10-PCS | Performed by: UROLOGY

## 2022-03-08 PROCEDURE — 77030008771 HC TU NG SALEM SUMP -A: Performed by: ANESTHESIOLOGY

## 2022-03-08 PROCEDURE — 88307 TISSUE EXAM BY PATHOLOGIST: CPT

## 2022-03-08 PROCEDURE — 74011250636 HC RX REV CODE- 250/636: Performed by: STUDENT IN AN ORGANIZED HEALTH CARE EDUCATION/TRAINING PROGRAM

## 2022-03-08 PROCEDURE — 76210000016 HC OR PH I REC 1 TO 1.5 HR: Performed by: SURGERY

## 2022-03-08 PROCEDURE — 77030002996 HC SUT SLK J&J -A: Performed by: SURGERY

## 2022-03-08 PROCEDURE — C1758 CATHETER, URETERAL: HCPCS | Performed by: SURGERY

## 2022-03-08 PROCEDURE — 77030040259 HC STPLR DEV SUREFORM DVNCI INTU -F: Performed by: SURGERY

## 2022-03-08 PROCEDURE — 36415 COLL VENOUS BLD VENIPUNCTURE: CPT

## 2022-03-08 PROCEDURE — 74011250637 HC RX REV CODE- 250/637: Performed by: SURGERY

## 2022-03-08 PROCEDURE — 0DTN4ZZ RESECTION OF SIGMOID COLON, PERCUTANEOUS ENDOSCOPIC APPROACH: ICD-10-PCS | Performed by: SURGERY

## 2022-03-08 PROCEDURE — 77030016151 HC PROTCTR LNS DFOG COVD -B: Performed by: SURGERY

## 2022-03-08 PROCEDURE — 77030002986 HC SUT PROL J&J -A: Performed by: SURGERY

## 2022-03-08 PROCEDURE — 77030020703 HC SEAL CANN DISP INTU -B: Performed by: SURGERY

## 2022-03-08 PROCEDURE — 76060000035 HC ANESTHESIA 2 TO 2.5 HR: Performed by: SURGERY

## 2022-03-08 PROCEDURE — 77030025303 HC STPLR ENDOSC J&J -G: Performed by: SURGERY

## 2022-03-08 PROCEDURE — 74011000250 HC RX REV CODE- 250: Performed by: SURGERY

## 2022-03-08 PROCEDURE — C1769 GUIDE WIRE: HCPCS | Performed by: SURGERY

## 2022-03-08 PROCEDURE — 77030040260 HC STPLR RELD SUREFORM DVNCI INTU -C: Performed by: SURGERY

## 2022-03-08 PROCEDURE — 77030025171 HC FCPS ENDOSC DISP 2 J&J -B: Performed by: SURGERY

## 2022-03-08 PROCEDURE — 76010000876 HC OR TIME 2 TO 2.5HR INTENSV - TIER 2: Performed by: SURGERY

## 2022-03-08 PROCEDURE — 77030035029 HC NDL INSUF VERES DISP COVD -B: Performed by: SURGERY

## 2022-03-08 RX ORDER — HYDROMORPHONE HYDROCHLORIDE 1 MG/ML
0.25 INJECTION, SOLUTION INTRAMUSCULAR; INTRAVENOUS; SUBCUTANEOUS
Status: DISCONTINUED | OUTPATIENT
Start: 2022-03-08 | End: 2022-03-11 | Stop reason: HOSPADM

## 2022-03-08 RX ORDER — INDOCYANINE GREEN AND WATER 25 MG
KIT INJECTION AS NEEDED
Status: DISCONTINUED | OUTPATIENT
Start: 2022-03-08 | End: 2022-03-08 | Stop reason: HOSPADM

## 2022-03-08 RX ORDER — SODIUM CHLORIDE 0.9 % (FLUSH) 0.9 %
5-40 SYRINGE (ML) INJECTION AS NEEDED
Status: DISCONTINUED | OUTPATIENT
Start: 2022-03-08 | End: 2022-03-11 | Stop reason: HOSPADM

## 2022-03-08 RX ORDER — ONDANSETRON 2 MG/ML
4 INJECTION INTRAMUSCULAR; INTRAVENOUS
Status: DISCONTINUED | OUTPATIENT
Start: 2022-03-08 | End: 2022-03-11 | Stop reason: HOSPADM

## 2022-03-08 RX ORDER — GABAPENTIN 300 MG/1
600 CAPSULE ORAL ONCE
Status: COMPLETED | OUTPATIENT
Start: 2022-03-08 | End: 2022-03-08

## 2022-03-08 RX ORDER — ONDANSETRON 2 MG/ML
INJECTION INTRAMUSCULAR; INTRAVENOUS AS NEEDED
Status: DISCONTINUED | OUTPATIENT
Start: 2022-03-08 | End: 2022-03-08 | Stop reason: HOSPADM

## 2022-03-08 RX ORDER — LIDOCAINE HYDROCHLORIDE ANHYDROUS AND DEXTROSE MONOHYDRATE .8; 5 G/100ML; G/100ML
INJECTION, SOLUTION INTRAVENOUS
Status: DISCONTINUED | OUTPATIENT
Start: 2022-03-08 | End: 2022-03-08 | Stop reason: HOSPADM

## 2022-03-08 RX ORDER — METRONIDAZOLE 500 MG/100ML
500 INJECTION, SOLUTION INTRAVENOUS ONCE
Status: COMPLETED | OUTPATIENT
Start: 2022-03-08 | End: 2022-03-08

## 2022-03-08 RX ORDER — LEVOFLOXACIN 5 MG/ML
500 INJECTION, SOLUTION INTRAVENOUS ONCE
Status: COMPLETED | OUTPATIENT
Start: 2022-03-08 | End: 2022-03-08

## 2022-03-08 RX ORDER — SODIUM CHLORIDE 0.9 % (FLUSH) 0.9 %
5-40 SYRINGE (ML) INJECTION EVERY 8 HOURS
Status: DISCONTINUED | OUTPATIENT
Start: 2022-03-08 | End: 2022-03-11 | Stop reason: HOSPADM

## 2022-03-08 RX ORDER — ONDANSETRON 4 MG/1
4 TABLET, ORALLY DISINTEGRATING ORAL
Status: DISCONTINUED | OUTPATIENT
Start: 2022-03-08 | End: 2022-03-11 | Stop reason: HOSPADM

## 2022-03-08 RX ORDER — MIDAZOLAM HYDROCHLORIDE 1 MG/ML
1 INJECTION, SOLUTION INTRAMUSCULAR; INTRAVENOUS AS NEEDED
Status: DISCONTINUED | OUTPATIENT
Start: 2022-03-08 | End: 2022-03-08 | Stop reason: HOSPADM

## 2022-03-08 RX ORDER — CELECOXIB 200 MG/1
200 CAPSULE ORAL ONCE
Status: COMPLETED | OUTPATIENT
Start: 2022-03-08 | End: 2022-03-08

## 2022-03-08 RX ORDER — SUCCINYLCHOLINE CHLORIDE 20 MG/ML
INJECTION INTRAMUSCULAR; INTRAVENOUS AS NEEDED
Status: DISCONTINUED | OUTPATIENT
Start: 2022-03-08 | End: 2022-03-08 | Stop reason: HOSPADM

## 2022-03-08 RX ORDER — DEXMEDETOMIDINE HYDROCHLORIDE 100 UG/ML
INJECTION, SOLUTION INTRAVENOUS AS NEEDED
Status: DISCONTINUED | OUTPATIENT
Start: 2022-03-08 | End: 2022-03-08 | Stop reason: HOSPADM

## 2022-03-08 RX ORDER — FENTANYL CITRATE 50 UG/ML
25 INJECTION, SOLUTION INTRAMUSCULAR; INTRAVENOUS
Status: DISCONTINUED | OUTPATIENT
Start: 2022-03-08 | End: 2022-03-08 | Stop reason: HOSPADM

## 2022-03-08 RX ORDER — PROPOFOL 10 MG/ML
INJECTION, EMULSION INTRAVENOUS AS NEEDED
Status: DISCONTINUED | OUTPATIENT
Start: 2022-03-08 | End: 2022-03-08 | Stop reason: HOSPADM

## 2022-03-08 RX ORDER — SODIUM CHLORIDE, SODIUM LACTATE, POTASSIUM CHLORIDE, CALCIUM CHLORIDE 600; 310; 30; 20 MG/100ML; MG/100ML; MG/100ML; MG/100ML
25 INJECTION, SOLUTION INTRAVENOUS CONTINUOUS
Status: DISCONTINUED | OUTPATIENT
Start: 2022-03-08 | End: 2022-03-08 | Stop reason: HOSPADM

## 2022-03-08 RX ORDER — ACETAMINOPHEN 325 MG/1
650 TABLET ORAL EVERY 6 HOURS
Status: DISCONTINUED | OUTPATIENT
Start: 2022-03-08 | End: 2022-03-11 | Stop reason: HOSPADM

## 2022-03-08 RX ORDER — ENOXAPARIN SODIUM 100 MG/ML
40 INJECTION SUBCUTANEOUS DAILY
Status: DISCONTINUED | OUTPATIENT
Start: 2022-03-09 | End: 2022-03-08

## 2022-03-08 RX ORDER — ACETAMINOPHEN 500 MG
1000 TABLET ORAL ONCE
Status: COMPLETED | OUTPATIENT
Start: 2022-03-08 | End: 2022-03-08

## 2022-03-08 RX ORDER — HYDROMORPHONE HYDROCHLORIDE 1 MG/ML
0.5 INJECTION, SOLUTION INTRAMUSCULAR; INTRAVENOUS; SUBCUTANEOUS
Status: DISCONTINUED | OUTPATIENT
Start: 2022-03-08 | End: 2022-03-11 | Stop reason: HOSPADM

## 2022-03-08 RX ORDER — EPHEDRINE SULFATE/0.9% NACL/PF 50 MG/5 ML
SYRINGE (ML) INTRAVENOUS AS NEEDED
Status: DISCONTINUED | OUTPATIENT
Start: 2022-03-08 | End: 2022-03-08 | Stop reason: HOSPADM

## 2022-03-08 RX ORDER — SODIUM CHLORIDE, SODIUM LACTATE, POTASSIUM CHLORIDE, CALCIUM CHLORIDE 600; 310; 30; 20 MG/100ML; MG/100ML; MG/100ML; MG/100ML
75 INJECTION, SOLUTION INTRAVENOUS CONTINUOUS
Status: DISCONTINUED | OUTPATIENT
Start: 2022-03-08 | End: 2022-03-08 | Stop reason: HOSPADM

## 2022-03-08 RX ORDER — OXYCODONE HYDROCHLORIDE 5 MG/1
10 TABLET ORAL
Status: DISCONTINUED | OUTPATIENT
Start: 2022-03-08 | End: 2022-03-11 | Stop reason: HOSPADM

## 2022-03-08 RX ORDER — PHENYLEPHRINE HCL IN 0.9% NACL 0.4MG/10ML
SYRINGE (ML) INTRAVENOUS AS NEEDED
Status: DISCONTINUED | OUTPATIENT
Start: 2022-03-08 | End: 2022-03-08 | Stop reason: HOSPADM

## 2022-03-08 RX ORDER — LIDOCAINE HYDROCHLORIDE ANHYDROUS AND DEXTROSE MONOHYDRATE .8; 5 G/100ML; G/100ML
1 INJECTION, SOLUTION INTRAVENOUS CONTINUOUS
Status: DISPENSED | OUTPATIENT
Start: 2022-03-08 | End: 2022-03-09

## 2022-03-08 RX ORDER — BUPIVACAINE HYDROCHLORIDE AND EPINEPHRINE 5; 5 MG/ML; UG/ML
INJECTION, SOLUTION EPIDURAL; INTRACAUDAL; PERINEURAL AS NEEDED
Status: DISCONTINUED | OUTPATIENT
Start: 2022-03-08 | End: 2022-03-08 | Stop reason: HOSPADM

## 2022-03-08 RX ORDER — ROCURONIUM BROMIDE 10 MG/ML
INJECTION, SOLUTION INTRAVENOUS AS NEEDED
Status: DISCONTINUED | OUTPATIENT
Start: 2022-03-08 | End: 2022-03-08 | Stop reason: HOSPADM

## 2022-03-08 RX ORDER — LIDOCAINE HYDROCHLORIDE 20 MG/ML
INJECTION, SOLUTION EPIDURAL; INFILTRATION; INTRACAUDAL; PERINEURAL AS NEEDED
Status: DISCONTINUED | OUTPATIENT
Start: 2022-03-08 | End: 2022-03-08 | Stop reason: HOSPADM

## 2022-03-08 RX ORDER — GLYCOPYRROLATE 0.2 MG/ML
INJECTION INTRAMUSCULAR; INTRAVENOUS AS NEEDED
Status: DISCONTINUED | OUTPATIENT
Start: 2022-03-08 | End: 2022-03-08 | Stop reason: HOSPADM

## 2022-03-08 RX ORDER — MAGNESIUM SULFATE HEPTAHYDRATE 40 MG/ML
INJECTION, SOLUTION INTRAVENOUS AS NEEDED
Status: DISCONTINUED | OUTPATIENT
Start: 2022-03-08 | End: 2022-03-08 | Stop reason: HOSPADM

## 2022-03-08 RX ORDER — ONDANSETRON 2 MG/ML
4 INJECTION INTRAMUSCULAR; INTRAVENOUS AS NEEDED
Status: DISCONTINUED | OUTPATIENT
Start: 2022-03-08 | End: 2022-03-08 | Stop reason: HOSPADM

## 2022-03-08 RX ORDER — KETAMINE HYDROCHLORIDE 10 MG/ML
INJECTION, SOLUTION INTRAMUSCULAR; INTRAVENOUS AS NEEDED
Status: DISCONTINUED | OUTPATIENT
Start: 2022-03-08 | End: 2022-03-08 | Stop reason: HOSPADM

## 2022-03-08 RX ORDER — FENTANYL CITRATE 50 UG/ML
25 INJECTION, SOLUTION INTRAMUSCULAR; INTRAVENOUS
Status: COMPLETED | OUTPATIENT
Start: 2022-03-08 | End: 2022-03-08

## 2022-03-08 RX ORDER — OXYCODONE HYDROCHLORIDE 5 MG/1
5 TABLET ORAL
Status: DISCONTINUED | OUTPATIENT
Start: 2022-03-08 | End: 2022-03-11 | Stop reason: HOSPADM

## 2022-03-08 RX ORDER — NEOSTIGMINE METHYLSULFATE 1 MG/ML
INJECTION, SOLUTION INTRAVENOUS AS NEEDED
Status: DISCONTINUED | OUTPATIENT
Start: 2022-03-08 | End: 2022-03-08 | Stop reason: HOSPADM

## 2022-03-08 RX ORDER — KETOROLAC TROMETHAMINE 30 MG/ML
30 INJECTION, SOLUTION INTRAMUSCULAR; INTRAVENOUS EVERY 6 HOURS
Status: DISCONTINUED | OUTPATIENT
Start: 2022-03-08 | End: 2022-03-08

## 2022-03-08 RX ORDER — MIDAZOLAM HYDROCHLORIDE 1 MG/ML
INJECTION, SOLUTION INTRAMUSCULAR; INTRAVENOUS AS NEEDED
Status: DISCONTINUED | OUTPATIENT
Start: 2022-03-08 | End: 2022-03-08 | Stop reason: HOSPADM

## 2022-03-08 RX ORDER — DEXAMETHASONE SODIUM PHOSPHATE 4 MG/ML
INJECTION, SOLUTION INTRA-ARTICULAR; INTRALESIONAL; INTRAMUSCULAR; INTRAVENOUS; SOFT TISSUE AS NEEDED
Status: DISCONTINUED | OUTPATIENT
Start: 2022-03-08 | End: 2022-03-08 | Stop reason: HOSPADM

## 2022-03-08 RX ORDER — SODIUM CHLORIDE, SODIUM LACTATE, POTASSIUM CHLORIDE, CALCIUM CHLORIDE 600; 310; 30; 20 MG/100ML; MG/100ML; MG/100ML; MG/100ML
INJECTION, SOLUTION INTRAVENOUS
Status: DISCONTINUED | OUTPATIENT
Start: 2022-03-08 | End: 2022-03-08 | Stop reason: HOSPADM

## 2022-03-08 RX ORDER — FENTANYL CITRATE 50 UG/ML
INJECTION, SOLUTION INTRAMUSCULAR; INTRAVENOUS AS NEEDED
Status: DISCONTINUED | OUTPATIENT
Start: 2022-03-08 | End: 2022-03-08 | Stop reason: HOSPADM

## 2022-03-08 RX ORDER — SODIUM CHLORIDE, SODIUM LACTATE, POTASSIUM CHLORIDE, CALCIUM CHLORIDE 600; 310; 30; 20 MG/100ML; MG/100ML; MG/100ML; MG/100ML
75 INJECTION, SOLUTION INTRAVENOUS CONTINUOUS
Status: DISCONTINUED | OUTPATIENT
Start: 2022-03-08 | End: 2022-03-11 | Stop reason: HOSPADM

## 2022-03-08 RX ADMIN — ACETAMINOPHEN 325MG 650 MG: 325 TABLET ORAL at 18:41

## 2022-03-08 RX ADMIN — SODIUM CHLORIDE, PRESERVATIVE FREE 10 ML: 5 INJECTION INTRAVENOUS at 22:00

## 2022-03-08 RX ADMIN — Medication 2 G: at 08:19

## 2022-03-08 RX ADMIN — PROPOFOL 180 MG: 10 INJECTION, EMULSION INTRAVENOUS at 07:35

## 2022-03-08 RX ADMIN — Medication 1 AMPULE: at 12:00

## 2022-03-08 RX ADMIN — ROCURONIUM BROMIDE 50 MG: 10 INJECTION INTRAVENOUS at 07:44

## 2022-03-08 RX ADMIN — Medication 100 MCG: at 08:06

## 2022-03-08 RX ADMIN — INDOCYANINE GREEN 5 MG: KIT INTRAVENOUS at 08:57

## 2022-03-08 RX ADMIN — GABAPENTIN 600 MG: 300 CAPSULE ORAL at 07:07

## 2022-03-08 RX ADMIN — DEXMEDETOMIDINE HYDROCHLORIDE 8 MCG: 100 INJECTION, SOLUTION, CONCENTRATE INTRAVENOUS at 09:50

## 2022-03-08 RX ADMIN — SODIUM CHLORIDE, POTASSIUM CHLORIDE, SODIUM LACTATE AND CALCIUM CHLORIDE: 600; 310; 30; 20 INJECTION, SOLUTION INTRAVENOUS at 07:28

## 2022-03-08 RX ADMIN — OXYCODONE 5 MG: 5 TABLET ORAL at 18:41

## 2022-03-08 RX ADMIN — LEVOFLOXACIN 500 MG: 5 INJECTION, SOLUTION INTRAVENOUS at 07:55

## 2022-03-08 RX ADMIN — NALOXEGOL OXALATE 25 MG: 25 TABLET, FILM COATED ORAL at 07:07

## 2022-03-08 RX ADMIN — FENTANYL CITRATE 25 MCG: 0.05 INJECTION, SOLUTION INTRAMUSCULAR; INTRAVENOUS at 10:18

## 2022-03-08 RX ADMIN — Medication 10 MG: at 08:08

## 2022-03-08 RX ADMIN — FENTANYL CITRATE 25 MCG: 0.05 INJECTION, SOLUTION INTRAMUSCULAR; INTRAVENOUS at 10:25

## 2022-03-08 RX ADMIN — LIDOCAINE HYDROCHLORIDE 100 MG: 20 INJECTION, SOLUTION INTRAVENOUS at 07:35

## 2022-03-08 RX ADMIN — Medication 3 MG: at 09:31

## 2022-03-08 RX ADMIN — SODIUM CHLORIDE, POTASSIUM CHLORIDE, SODIUM LACTATE AND CALCIUM CHLORIDE 25 ML/HR: 600; 310; 30; 20 INJECTION, SOLUTION INTRAVENOUS at 07:05

## 2022-03-08 RX ADMIN — ACETAMINOPHEN 325MG 650 MG: 325 TABLET ORAL at 13:28

## 2022-03-08 RX ADMIN — CELECOXIB 200 MG: 200 CAPSULE ORAL at 07:07

## 2022-03-08 RX ADMIN — KETAMINE HYDROCHLORIDE 10 MG: 10 INJECTION, SOLUTION INTRAMUSCULAR; INTRAVENOUS at 08:24

## 2022-03-08 RX ADMIN — Medication 1000 MG: at 07:07

## 2022-03-08 RX ADMIN — DEXMEDETOMIDINE HYDROCHLORIDE 8 MCG: 100 INJECTION, SOLUTION, CONCENTRATE INTRAVENOUS at 08:34

## 2022-03-08 RX ADMIN — OXYCODONE 5 MG: 5 TABLET ORAL at 13:28

## 2022-03-08 RX ADMIN — OXYCODONE 5 MG: 5 TABLET ORAL at 23:09

## 2022-03-08 RX ADMIN — GLYCOPYRROLATE 0.4 MG: 0.2 INJECTION, SOLUTION INTRAMUSCULAR; INTRAVENOUS at 09:31

## 2022-03-08 RX ADMIN — SODIUM CHLORIDE 1000 ML: 9 INJECTION, SOLUTION INTRAVENOUS at 18:00

## 2022-03-08 RX ADMIN — KETAMINE HYDROCHLORIDE 20 MG: 10 INJECTION, SOLUTION INTRAMUSCULAR; INTRAVENOUS at 09:50

## 2022-03-08 RX ADMIN — SUCCINYLCHOLINE CHLORIDE 140 MG: 20 INJECTION, SOLUTION INTRAMUSCULAR; INTRAVENOUS at 07:37

## 2022-03-08 RX ADMIN — FENTANYL CITRATE 100 MCG: 50 INJECTION, SOLUTION INTRAMUSCULAR; INTRAVENOUS at 07:34

## 2022-03-08 RX ADMIN — Medication 3 AMPULE: at 07:06

## 2022-03-08 RX ADMIN — SODIUM CHLORIDE 30 MCG/MIN: 900 INJECTION, SOLUTION INTRAVENOUS at 07:53

## 2022-03-08 RX ADMIN — Medication 40 MCG: at 07:50

## 2022-03-08 RX ADMIN — Medication 1 AMPULE: at 21:31

## 2022-03-08 RX ADMIN — DEXAMETHASONE SODIUM PHOSPHATE 8 MG: 4 INJECTION, SOLUTION INTRAMUSCULAR; INTRAVENOUS at 07:44

## 2022-03-08 RX ADMIN — PROPOFOL 20 MG: 10 INJECTION, EMULSION INTRAVENOUS at 07:38

## 2022-03-08 RX ADMIN — METRONIDAZOLE 500 MG: 500 INJECTION, SOLUTION INTRAVENOUS at 07:51

## 2022-03-08 RX ADMIN — DEXMEDETOMIDINE HYDROCHLORIDE 12 MCG: 100 INJECTION, SOLUTION, CONCENTRATE INTRAVENOUS at 08:26

## 2022-03-08 RX ADMIN — ONDANSETRON HYDROCHLORIDE 4 MG: 2 INJECTION, SOLUTION INTRAMUSCULAR; INTRAVENOUS at 09:22

## 2022-03-08 RX ADMIN — LIDOCAINE HYDROCHLORIDE 2 MG/KG/HR: 8 INJECTION, SOLUTION INTRAVENOUS at 07:29

## 2022-03-08 RX ADMIN — FENTANYL CITRATE 25 MCG: 0.05 INJECTION, SOLUTION INTRAMUSCULAR; INTRAVENOUS at 10:33

## 2022-03-08 RX ADMIN — MIDAZOLAM HYDROCHLORIDE 2 MG: 1 INJECTION, SOLUTION INTRAMUSCULAR; INTRAVENOUS at 07:25

## 2022-03-08 RX ADMIN — KETAMINE HYDROCHLORIDE 20 MG: 10 INJECTION, SOLUTION INTRAMUSCULAR; INTRAVENOUS at 07:54

## 2022-03-08 RX ADMIN — PROPOFOL 40 MG: 10 INJECTION, EMULSION INTRAVENOUS at 08:18

## 2022-03-08 NOTE — OP NOTES
Pre op dx: diverticulitis for colon resection requesting intraoperative ureteral stent placement  Post op dx: same with normal bladder mucosa and ureteral orifices  Operative procedure: cystoscopy and bilateral ureteral stent placement  Surgeon: Yamileth Burch MD  Assistant: OR staff  Anesthesia: general  Findings: normal urethra, mildly obstructing prostatic urethra, normal bladder mucosa, normal ureteral orifices.   EBL: none  Complications: none  Implants: bilateral ureteral stents  Specimens: none  Yamileth Burch MD

## 2022-03-08 NOTE — PROGRESS NOTES
End of Shift Note    Bedside shift change report given to Jaylon Ray (oncoming nurse) by Lakisha Jackson RN (offgoing nurse). Report included the following information SBAR, Kardex, ED Summary, Intake/Output, MAR and Recent Results    Shift worked:  7a-7p     Shift summary and any significant changes:    Pt transferred from pacu, Prn oxy given for pain, wife at bedside for part of day, valles d/c, while ambulating pt to bathroom he felt dizzy after taking his BP it was very low. md notified and bolus given along with H+H drawn, pt BP stable and dizziness subsided.     Concerns for physician to address: none   Zone phone for oncoming shift:  [phone]       Lakisha Jackson RN

## 2022-03-08 NOTE — PERIOP NOTES
Handoff Report from Operating Room to PACU  7233  Report received from 19 Farrah Harris regarding Marilyn Pardo. Surgeon(s):  MD Anita Forman MD  And Procedure(s) (LRB):  ROBOTIC ASSISTED LAPAROSCOPIC SIGMOID COLECTOMY WITH INTRA-OPERATIVE FLEXIBLE SIGMOIDOSCOPY, CYSTOSCOPY INSERTION BILATERAL URETERAL CATHETERS (E R A S) (N/A)  . (Bilateral)  confirmed   with allergies and dressings discussed. Anesthesia type, drugs, patient history, complications, estimated blood loss, vital signs, intake and output, and last pain medication, lines, reversal medications and temperature were reviewed. 10:50 AM  TRANSFER - OUT REPORT:    Verbal report given on Marilyn Pardo  being transferred to Surg Tele(unit) for routine progression of care       Report consisted of patients Situation, Background, Assessment and   Recommendations(SBAR). Information from the following report(s) SBAR, Kardex, MAR and Accordion was reviewed with the receiving nurse. Lines:   Peripheral IV 03/08/22 Right Hand (Active)   Site Assessment Clean, dry, & intact 03/08/22 1045   Phlebitis Assessment 0 03/08/22 1045   Infiltration Assessment 0 03/08/22 1045   Dressing Status Clean, dry, & intact 03/08/22 1045   Dressing Type Transparent;Tape 03/08/22 1045   Hub Color/Line Status Green;Capped 03/08/22 1045       Peripheral IV 03/08/22 Left Hand (Active)   Site Assessment Clean, dry, & intact 03/08/22 1045   Phlebitis Assessment 0 03/08/22 1045   Infiltration Assessment 0 03/08/22 1045   Dressing Status Clean, dry, & intact 03/08/22 1045   Dressing Type Transparent;Tape 03/08/22 1045   Hub Color/Line Status Pink; Infusing 03/08/22 1045        Opportunity for questions and clarification was provided. Spouse Dierdre Slipper updated via phone.     Patient transported with:   O2 @ 2 liters  Tech

## 2022-03-08 NOTE — OP NOTES
Operative Report    Patient: Pete Moore MRN: [id number]  SSN: [id number]    YOB: 1970  Age: 46 y.o. Sex: male       Date of Surgery: 3/8/2022     Preoperative Diagnosis: DIVERTICULITIS     Postoperative Diagnosis: DIVERTICULITIS     Surgeon(s) and Role:  Panel 1:     * Keturah Baer MD - Primary  Panel 2:     * Zia Cruz MD - Primary    Anesthesia: General     Procedure: Procedure(s):  ROBOTIC ASSISTED LAPAROSCOPIC SIGMOID COLECTOMY WITH INTRA-OPERATIVE FLEXIBLE SIGMOIDOSCOPY, CYSTOSCOPY INSERTION BILATERAL URETERAL CATHETERS (E R A S)  . Procedure in Detail:   The patient was taken to the operating suite and placed in the prone jackknife position. General endotracheal anesthesia was induced. The patient was placed in the lithotomy position. A cystoscopy and bilateral ureteral catheters were placed. For more details of this procedure, please refer to the urology operative note. The patient was then placed in the low lithotomy position with the arms tucked. The abdomen was prepped and draped in the usual sterile fashion. A timeout was performed as per hospital protocol. An 8mm incision was made in the left upper quadrant and a Veress needle was inserted into the abdominal cavity. The abdomen was insufflated. An 8mm robotic trocar was placed into the abdominal cavity under direct visualization using a 5mm laparoscope. A second 8mm robotic trocar was placed in the right mid abdomen and a 12mm robotic trocar was placed in the right lower quadrant. A 5mm assistant trocar was placed in the right upper quadrant. It was through these 4 sites that the entirety of the procedure was completed. The patient was placed in the steep Trendelenburg position with left side up and the robot was docked. The sigmoid colon noted to be chronically inflamed from diverticulitis. The sigmoid colon was elevated identifying the mesentery overlying the sacral promontory.   This mesentery was scored along the medial edge. The avascular plane between the sigmoid colon mesentery and retroperitoneum was identified. The left ureter was identified and swept posteriorly away from the dissection plane. Dissection was continued in a medial-to-lateral plane freeing up the posterior aspect of the mesentery away from the retroperitoneum. The inferior mesenteric artery and vein were skeletonized at the base and they were divided separately using the Vessel Sealer by cauterizing once proximally, once distally and dividing in between. The white line of Toldt was divided all the way up along the lateral peritoneal reflection of the descending colon. This dissection was carried out all the way to the splenic flexure. The distal dissection margin was identified at the rectosigmoid junction where the tinea splayed overlying the sacral promontory. The mesentery was divided to this level. The rectosigmoid junction was divided using a robotic 60mm green load stapler. The proximal margin was identified between the descending and sigmoid colon in an area free of chronic diverticulitis with soft, pliable colon wall. The mesentery was divided to this level. After complete mobilization of the descending colon, the proximal margin was easily able to reach the pelvis deeply without any undue tension. Anesthesia injected ICG dye to confirm excellent blood supply to the proximal and distal margins. The robot was undocked. The right lower quadrant was extended and a mini wound protector was placed. The bowel was exteriorized through this incision and the area toweled off. An automatic pursestring suture was applied at the proximal margin. The bowel was divided and sent to pathology for further examination. A 29mm EEA anvil was placed into the colotomy and the pursestring suture was tied down.   The pericolic fat was cleared off the anvil and a second pursestring suture was placed using 2-0 Prolene. The dirty instruments were removed and gloves were changed. The anvil was dropped back into the abdominal cavity and the abdomen was reinsufflated. EEA sizers were placed transanally up to the transverse staple line. This was followed by the 29 EEA stapler. The spike was deployed and the anvil was mated with the spike. The stapler was closed and fired. Two complete donuts were identified and sent to pathology for further examination. A flexible sigmoidoscopy was then performed by inserting an Olympus colonoscopy transanally up to the anastomosis. The anastomosis was gently insufflated while simultaneously irrigating the pelvis. There was pink healthy mucosa on both sides of the anastomosis, and there were no evidence of leak. The circular staple line was completely intact. The colonoscope was withdrawn. The right lower quadrant incision was closed with 2-0 Vicryl along the peritoneum followed by 0 looped PDS suture along the fascia. All the skin incisions were closed with 4-0 Monocryl. 30ml of 0.25% Marcaine with epinephrine was injected subcutaneously. The patient was awakened, extubated, and taken to the recovery room in stable condition. Estimated Blood Loss:  50ml    Tourniquet Time: * No tourniquets in log *      Implants: * No implants in log *            Specimens:   ID Type Source Tests Collected by Time Destination   1 : proximal ring Preservative Sigmoid  Ray Pedro MD 3/8/2022 0148 Pathology   2 : distal ring Preservative Silvia Pedro MD 3/8/2022 1925 Pathology   3 : Sigmoid colon Preservative Silvia Pedro MD 3/8/2022 2973 Pathology           Drains: None                Complications: None    Counts: Sponge and needle counts were correct times two.     Signed By:  Yannick Barba MD     March 8, 2022

## 2022-03-08 NOTE — ANESTHESIA POSTPROCEDURE EVALUATION
Procedure(s):  ROBOTIC ASSISTED LAPAROSCOPIC SIGMOID COLECTOMY WITH INTRA-OPERATIVE FLEXIBLE SIGMOIDOSCOPY, CYSTOSCOPY INSERTION BILATERAL URETERAL CATHETERS (E R A S)  . .    general    Anesthesia Post Evaluation      Multimodal analgesia: multimodal analgesia used between 6 hours prior to anesthesia start to PACU discharge  Patient location during evaluation: PACU  Level of consciousness: sleepy but conscious  Pain management: adequate  Airway patency: patent  Anesthetic complications: no  Cardiovascular status: acceptable  Respiratory status: acceptable  Hydration status: acceptable  Comments: +Post-Anesthesia Evaluation and Assessment    Patient: Pete Moore MRN: [id number]  SSN: [id number]   YOB: 1970  Age: 46 y.o. Sex: male      Cardiovascular Function/Vital Signs    /69   Pulse 69   Temp 36.6 °C (97.8 °F)   Resp 14   Ht 5' 8\" (1.727 m)   Wt 74.3 kg (163 lb 12.8 oz)   SpO2 99%   BMI 24.91 kg/m²     Patient is status post Procedure(s):  ROBOTIC ASSISTED LAPAROSCOPIC SIGMOID COLECTOMY WITH INTRA-OPERATIVE FLEXIBLE SIGMOIDOSCOPY, CYSTOSCOPY INSERTION BILATERAL URETERAL CATHETERS (E R A S)  . .    Nausea/Vomiting: Controlled. Postoperative hydration reviewed and adequate. Pain:  Pain Scale 1: Numeric (0 - 10) (03/08/22 1038)  Pain Intensity 1: 4 (03/08/22 1038)   Managed. Neurological Status:   Neuro (WDL): Exceptions to WDL (03/08/22 0951)   At baseline. Mental Status and Level of Consciousness: Arousable. Pulmonary Status:   O2 Device: Nasal cannula (03/08/22 0951)   Adequate oxygenation and airway patent. Complications related to anesthesia: None    Post-anesthesia assessment completed. No concerns.     Signed By: Tristen Dave MD    3/8/2022  Post anesthesia nausea and vomiting:  controlled  Final Post Anesthesia Temperature Assessment:  Normothermia (36.0-37.5 degrees C)      INITIAL Post-op Vital signs:   Vitals Value Taken Time   /66 03/08/22 1030 Temp 36.6 °C (97.8 °F) 03/08/22 0951   Pulse 75 03/08/22 1040   Resp 12 03/08/22 1040   SpO2 99 % 03/08/22 1040   Vitals shown include unvalidated device data.

## 2022-03-08 NOTE — BRIEF OP NOTE
Brief Postoperative Note    Patient: Fernanda Ron  YOB: 1970  MRN: [id number]    Date of Procedure: 3/8/2022     Pre-Op Diagnosis: DIVERTICULITIS    Post-Op Diagnosis: Same as preoperative diagnosis. Procedure(s):  ROBOTIC ASSISTED LAPAROSCOPIC SIGMOID COLECTOMY WITH INTRA-OPERATIVE FLEXIBLE SIGMOIDOSCOPY, CYSTOSCOPY INSERTION BILATERAL URETERAL CATHETERS (E R A S)  .     Surgeon(s):  MD Mary Newberry MD    Surgical Assistant: Surg Asst-1: Keyur Sanders RN    Anesthesia: General     Estimated Blood Loss (mL): 21JF    Complications: None    Specimens:   ID Type Source Tests Collected by Time Destination   1 : proximal ring Preservative Sigmoid  Taylor Palomares MD 3/8/2022 1921 Pathology   2 : distal ring Preservative Sigmoid  Taylor Palomares MD 3/8/2022 9924 Pathology   3 : Sigmoid colon Preservative Sigmoid  Taylor Palomares MD 3/8/2022 5658 Pathology        Implants: * No implants in log *    Drains:   Nephroureteral Drain 03/08/22 Left Ureter (Active)   Site Assessment Clean, dry, & intact 03/08/22 0851       Nephroureteral Drain 03/08/22 Right Ureter (Active)   Site Assessment Clean, dry, & intact 03/08/22 0851       Findings: Chronically inflamed sigmoid colon    Electronically Signed by Chyna Barry MD on 3/8/2022 at 9:28 AM

## 2022-03-08 NOTE — OP NOTES
[address]  OPERATIVE REPORT    Name:  Marcell Witt  MR#:  [id number]  :  1970  ACCOUNT #:  [de-identified]  DATE OF SERVICE:  2022    PREOPERATIVE DIAGNOSES:  Colonic diverticulitis with schedule for a partial colectomy by Dr. 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 POSTOPERATIVE DIAGNOSES:  Colonic diverticulitis with schedule for a partial colectomy by Dr. 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 PROCEDURE PERFORMED:  Cystoscopy and bilateral ureteral stent placement. SURGEON:  Meli Fairbanks MD    ASSISTANT:  Operating room staff. ANESTHESIA:  General.    COMPLICATIONS:  There were no intraoperative complications. SPECIMENS REMOVED:  No specimens sent. IMPLANTS:  Bilateral ureteral stents. ESTIMATED BLOOD LOSS:  No blood loss. CLINICAL INDICATIONS:  The patient is a 53-year-old gentleman who had been seen and followed by Dr. Shikha Azul and was scheduled for a partial colectomy for diverticulitis. He had requested intraoperative ureteral stent placement for ureteral identification. We had discussed this with the patient and he consented to proceed. He has also had a history of superficial transitional cell carcinoma of the bladder and I was going to fix that as well at the time of his cystoscopy. Potential risks and complications of this were reviewed. OPERATIVE PROCEDURE:  He was taken to the operating room and placed in the dorsal lithotomy position after general anesthesia was induced. His penis and scrotum were prepped and draped. He had been given parenteral antibiotics preoperatively. A time-out was taken.   A cystoscope was introduced. His urethra was normal.  Prostatic urethra was only mildly obstructing. Abnormal mucosa. A careful inspection of his bladder revealed normal mucosa throughout. His ureteral orifices were normal.  An 0.035 Bentson wire was then advanced into his right ureteral orifice up to the level of his renal pelvis and a 5-Hungarian angiographic catheter was advanced over this wire to his renal pelvis. The wire was removed and then advanced up to the left ureter to the level of the renal pelvis and a second ureteral stent was placed over this into this ureter and the cystoscope and wire were removed leaving the stent in place. I then passed Wilkes catheter adjacent to the stent into his bladder, inflated the balloon, seated that in his bladder neck and then secured these stents to the Wilkes catheter with a silk suture. He tolerated this well.       Alma Degree, MD [email or web address]  D:  03/08/2022 8:07  T:  03/08/2022 11:23  JOB #:  [id number]

## 2022-03-08 NOTE — CONSULTS
[facility]    Name:  Yury Minaya  MR#:  [id number]  :  1970  ACCOUNT #:  [de-identified]  DATE OF SERVICE:  2022      DOCTOR REQUESTING:  Neelam Maddox II, MD    CHIEF COMPLAINT:  Diverticulitis for intraoperative ureteral stent placement. HISTORY:  This is a 41-year-old gentleman who had presented to Dr. Anyi Azul with diverticulitis, was seen and evaluated. He had a CT scan of his abdomen and pelvis in 2021 that had shown diverticulitis. His kidneys, ureters, and bladder were all normal on this image. He subsequently had persistent symptoms and was evaluated and scheduled for colon surgery by Dr. Anyi Azul. We were asked to place intraoperative ureteral stents for ureteral identification during this procedure. The patient's history is significant for low-grade transitional cell carcinoma and he has been treated by Dr. Ayanna Bills with cystoscopy and tumor resection on 2018. He subsequently had some followup done and repeat cystoscopy done on 2021 showing no evidence of recurrent disease. He has elected not to follow up since that time and I reviewed that with him today as well. He has been asymptomatic in that regard with no hematuria or other complaints. I did review a CT scan from 2021 that did not show any abnormalities of bladder or kidney. He has had no other voiding symptoms or complaints or history. PAST MEDICAL HISTORY:  Significant for his diverticulitis and his history of transitional cell carcinoma of the bladder. He is otherwise healthy. FAMILY HISTORY:  Significant for prostate cancer. SOCIAL HISTORY:  He is a full-term . Nonsmoker. Occasional alcohol use. PHYSICAL EXAMINATION:  VITAL SIGNS:  Vital signs are stable. HEENT:  Normal calvarium without evidence of trauma. Pupils are equal.  Sclerae is clear. Nose is normal.  NECK:  Supple without adenopathy. CHEST:  No labored breathing.   CARDIAC: Regular. ABDOMEN:  Soft without mass, tenderness, organomegaly, or hernia. GENITAL EXAM:  Deferred. EXTREMITIES:  No edema. NEUROLOGIC:  Exam is grossly intact. SKIN:  Visualized portions of the skin are dry and intact. LABORATORY DATA:  His lab values are reviewed. This includes his labs from 03/02/2022 showing a hemoglobin of 14.2, his white count was normal at 8.7. His urinalysis, 0-4 white cells, 0-4 red cells, negative for bacteria. This was on 03/02/2022. Chemistries showed normal creatinine of 1.09. CT scan is as reviewed from 09/16/2021 showing no urinary tract abnormalities and some diverticular disease. ASSESSMENT:  1. Colonic diverticulitis for surgical excision by Dr. Celestine Ramirez. 2.  History of transitional cell carcinoma of the bladder. I will inspect his bladder intraoperatively and I discussed this with him. We will plan to place the stents and I would defer any further evaluation until he has been treated for this diverticulitis. He will follow up with us. 3.  Family history of prostate cancer, he will need an outpatient PSA value and I have asked him to follow up with Dr. Nahid Navarro in that regard. Indications and risks of stent placement were reviewed in detail. Daljit Moreau MD      [email or web address]  D:  03/08/2022 7:26  T:  03/08/2022 8:56  JOB #:  [id number]  CC:   MD Jami England MD

## 2022-03-08 NOTE — PROGRESS NOTES
Pt c/o of dizzy and scared. /73 HR 74 94% on 2L NC. Lidocaine gtt going as ordered. Perfect serve to Dr Shikha Azul to inform. Pts wife at bedside and supportive. 12:47 Response from Dr Shikha Azul, stop lidocaine gtt and remove scopolomine patch.

## 2022-03-08 NOTE — ANESTHESIA PREPROCEDURE EVALUATION
Relevant Problems   No relevant active problems       Anesthetic History   No history of anesthetic complications            Review of Systems / Medical History  Patient summary reviewed, nursing notes reviewed and pertinent labs reviewed    Pulmonary  Within defined limits                 Neuro/Psych   Within defined limits           Cardiovascular  Within defined limits                Exercise tolerance: >4 METS     GI/Hepatic/Renal           PUD     Endo/Other        Cancer     Other Findings              Physical Exam    Airway  Mallampati: II  TM Distance: 4 - 6 cm  Neck ROM: normal range of motion   Mouth opening: Normal     Cardiovascular  Regular rate and rhythm,  S1 and S2 normal,  no murmur, click, rub, or gallop  Rhythm: regular  Rate: normal         Dental  No notable dental hx       Pulmonary  Breath sounds clear to auscultation               Abdominal  GI exam deferred       Other Findings            Anesthetic Plan    ASA: 2  Anesthesia type: general    Monitoring Plan: BIS      Induction: Intravenous  Anesthetic plan and risks discussed with: Patient

## 2022-03-09 LAB
ANION GAP SERPL CALC-SCNC: 6 MMOL/L (ref 5–15)
BASOPHILS # BLD: 0 K/UL (ref 0–0.1)
BASOPHILS NFR BLD: 0 % (ref 0–1)
BUN SERPL-MCNC: 7 MG/DL (ref 6–20)
BUN/CREAT SERPL: 9 (ref 12–20)
CALCIUM SERPL-MCNC: 8.7 MG/DL (ref 8.5–10.1)
CHLORIDE SERPL-SCNC: 108 MMOL/L (ref 97–108)
CO2 SERPL-SCNC: 25 MMOL/L (ref 21–32)
CREAT SERPL-MCNC: 0.81 MG/DL (ref 0.7–1.3)
DIFFERENTIAL METHOD BLD: ABNORMAL
EOSINOPHIL # BLD: 0 K/UL (ref 0–0.4)
EOSINOPHIL NFR BLD: 0 % (ref 0–7)
ERYTHROCYTE [DISTWIDTH] IN BLOOD BY AUTOMATED COUNT: 12.8 % (ref 11.5–14.5)
GLUCOSE SERPL-MCNC: 127 MG/DL (ref 65–100)
HCT VFR BLD AUTO: 37.1 % (ref 36.6–50.3)
HGB BLD-MCNC: 13.2 G/DL (ref 12.1–17)
IMM GRANULOCYTES # BLD AUTO: 0 K/UL (ref 0–0.04)
IMM GRANULOCYTES NFR BLD AUTO: 0 % (ref 0–0.5)
LYMPHOCYTES # BLD: 1.8 K/UL (ref 0.8–3.5)
LYMPHOCYTES NFR BLD: 17 % (ref 12–49)
MCH RBC QN AUTO: 31.2 PG (ref 26–34)
MCHC RBC AUTO-ENTMCNC: 35.6 G/DL (ref 30–36.5)
MCV RBC AUTO: 87.7 FL (ref 80–99)
MONOCYTES # BLD: 1.2 K/UL (ref 0–1)
MONOCYTES NFR BLD: 11 % (ref 5–13)
NEUTS SEG # BLD: 7.8 K/UL (ref 1.8–8)
NEUTS SEG NFR BLD: 72 % (ref 32–75)
NRBC # BLD: 0 K/UL (ref 0–0.01)
NRBC BLD-RTO: 0 PER 100 WBC
PLATELET # BLD AUTO: 253 K/UL (ref 150–400)
PMV BLD AUTO: 9.6 FL (ref 8.9–12.9)
POTASSIUM SERPL-SCNC: 4 MMOL/L (ref 3.5–5.1)
RBC # BLD AUTO: 4.23 M/UL (ref 4.1–5.7)
SODIUM SERPL-SCNC: 139 MMOL/L (ref 136–145)
WBC # BLD AUTO: 10.9 K/UL (ref 4.1–11.1)

## 2022-03-09 PROCEDURE — 36415 COLL VENOUS BLD VENIPUNCTURE: CPT

## 2022-03-09 PROCEDURE — 74011250637 HC RX REV CODE- 250/637: Performed by: SURGERY

## 2022-03-09 PROCEDURE — 85025 COMPLETE CBC W/AUTO DIFF WBC: CPT

## 2022-03-09 PROCEDURE — 80048 BASIC METABOLIC PNL TOTAL CA: CPT

## 2022-03-09 PROCEDURE — 94760 N-INVAS EAR/PLS OXIMETRY 1: CPT

## 2022-03-09 PROCEDURE — 77030038554 HC DRSG WND THERAHONEY MDII -Z

## 2022-03-09 PROCEDURE — 77010033678 HC OXYGEN DAILY

## 2022-03-09 PROCEDURE — 74011250636 HC RX REV CODE- 250/636: Performed by: NURSE PRACTITIONER

## 2022-03-09 PROCEDURE — 74011000250 HC RX REV CODE- 250: Performed by: SURGERY

## 2022-03-09 PROCEDURE — 65270000029 HC RM PRIVATE

## 2022-03-09 PROCEDURE — 77030038269 HC DRN EXT URIN PURWCK BARD -A

## 2022-03-09 PROCEDURE — 77030041076 HC DRSG AG OPTICELL MDII -A

## 2022-03-09 RX ORDER — ENOXAPARIN SODIUM 100 MG/ML
40 INJECTION SUBCUTANEOUS EVERY 24 HOURS
Status: DISCONTINUED | OUTPATIENT
Start: 2022-03-09 | End: 2022-03-11 | Stop reason: HOSPADM

## 2022-03-09 RX ADMIN — NALOXEGOL OXALATE 25 MG: 25 TABLET, FILM COATED ORAL at 09:46

## 2022-03-09 RX ADMIN — ACETAMINOPHEN 325MG 650 MG: 325 TABLET ORAL at 18:26

## 2022-03-09 RX ADMIN — SODIUM CHLORIDE, PRESERVATIVE FREE 10 ML: 5 INJECTION INTRAVENOUS at 22:00

## 2022-03-09 RX ADMIN — OXYCODONE 5 MG: 5 TABLET ORAL at 17:21

## 2022-03-09 RX ADMIN — ACETAMINOPHEN 325MG 650 MG: 325 TABLET ORAL at 13:20

## 2022-03-09 RX ADMIN — SODIUM CHLORIDE, PRESERVATIVE FREE 10 ML: 5 INJECTION INTRAVENOUS at 05:53

## 2022-03-09 RX ADMIN — Medication 1 AMPULE: at 22:06

## 2022-03-09 RX ADMIN — OXYCODONE 5 MG: 5 TABLET ORAL at 09:46

## 2022-03-09 RX ADMIN — ACETAMINOPHEN 325MG 650 MG: 325 TABLET ORAL at 08:06

## 2022-03-09 RX ADMIN — ENOXAPARIN SODIUM 40 MG: 100 INJECTION SUBCUTANEOUS at 09:46

## 2022-03-09 RX ADMIN — Medication 1 AMPULE: at 09:48

## 2022-03-09 RX ADMIN — ACETAMINOPHEN 325MG 650 MG: 325 TABLET ORAL at 01:01

## 2022-03-09 RX ADMIN — OXYCODONE 5 MG: 5 TABLET ORAL at 22:05

## 2022-03-09 RX ADMIN — OXYCODONE 5 MG: 5 TABLET ORAL at 13:20

## 2022-03-09 RX ADMIN — SODIUM CHLORIDE, PRESERVATIVE FREE 10 ML: 5 INJECTION INTRAVENOUS at 13:21

## 2022-03-09 NOTE — PROGRESS NOTES
Surgery NP Progress Note    Vickie Cooper  [id number]  male  46 y.o.  1970    s/p ROBOTIC ASSISTED LAPAROSCOPIC SIGMOID COLECTOMY WITH INTRA-OPERATIVE FLEXIBLE SIGMOIDOSCOPY, CYSTOSCOPY INSERTION BILATERAL URETERAL CATHETERS (E R A S) on 3/8/2022   Pt seen with Dr. Sheron Mitchell      Assessment:   Active Problems:    Diverticulitis (3/8/2022)      Expected post-op progress. Plan/Recommendations/Medical Decision Making:     - Mobilize with nursing and OOB to chair for meals  - Continue diet  - Pain management- Continue current pain control methods.   - VTE Prophylaxis: Lovenox- unheld this morning as b/p issues yesterday do not seem to be related to bleeding. H+H stable and b/p normal today. Subjective:     Patient feeling well this morning. Expected tenderness in the RLQ. Passing flatus, no bowel movement yet. Not much appetite. Objective:     Blood pressure 130/84, pulse 95, temperature 98.1 °F (36.7 °C), resp. rate 16, height 5' 8\" (1.727 m), weight 74.3 kg (163 lb 12.8 oz), SpO2 94 %. Temp (24hrs), Av.7 °F (36.5 °C), Min:97.5 °F (36.4 °C), Max:98.1 °F (36.7 °C)      Pt resting in bed. NAD   Incisions CDI. Abd soft and appropriately tender. SCDs for mechanical DVT proph while in bed     Body mass index is 24.91 kg/m². Reference: BMI greater than 30 is classified as obesity and greater than 40 is classified as morbid obesity.      Last 3 Recorded Weights in this Encounter    22 0630   Weight: 74.3 kg (163 lb 12.8 oz)         Kyrie Kerr NP   MSN, APRN, FNP-C, CWOCN-AP, RNFA    22

## 2022-03-09 NOTE — PROGRESS NOTES
0710 report given to day nurse by Sirisha Milton RN information includes Sbar, Kardex, MAR and labs. Pt medicated x 2 during night teaching on how to splint abdomen with pillow when getting up. Let the bed do the work. Received well by pt. Ambulated to bathroom 2 tolerated well stand by assist.Presently HOB high castillo position call tubbs in reach.

## 2022-03-09 NOTE — PROGRESS NOTES
Problem: Pressure Injury - Risk of  Goal: *Prevention of pressure injury  Description: Document Josias Scale and appropriate interventions in the flowsheet. Outcome: Progressing Towards Goal  Note: Pressure Injury Interventions:             Activity Interventions: Pressure redistribution bed/mattress(bed type)         Nutrition Interventions: Document food/fluid/supplement intake

## 2022-03-10 LAB
ANION GAP SERPL CALC-SCNC: 4 MMOL/L (ref 5–15)
BASOPHILS # BLD: 0 K/UL (ref 0–0.1)
BASOPHILS NFR BLD: 0 % (ref 0–1)
BUN SERPL-MCNC: 12 MG/DL (ref 6–20)
BUN/CREAT SERPL: 15 (ref 12–20)
CALCIUM SERPL-MCNC: 8.3 MG/DL (ref 8.5–10.1)
CHLORIDE SERPL-SCNC: 109 MMOL/L (ref 97–108)
CO2 SERPL-SCNC: 27 MMOL/L (ref 21–32)
CREAT SERPL-MCNC: 0.78 MG/DL (ref 0.7–1.3)
DIFFERENTIAL METHOD BLD: ABNORMAL
EOSINOPHIL # BLD: 0 K/UL (ref 0–0.4)
EOSINOPHIL NFR BLD: 0 % (ref 0–7)
ERYTHROCYTE [DISTWIDTH] IN BLOOD BY AUTOMATED COUNT: 13.2 % (ref 11.5–14.5)
GLUCOSE SERPL-MCNC: 97 MG/DL (ref 65–100)
HCT VFR BLD AUTO: 31.9 % (ref 36.6–50.3)
HGB BLD-MCNC: 10.8 G/DL (ref 12.1–17)
IMM GRANULOCYTES # BLD AUTO: 0 K/UL (ref 0–0.04)
IMM GRANULOCYTES NFR BLD AUTO: 0 % (ref 0–0.5)
LYMPHOCYTES # BLD: 4.4 K/UL (ref 0.8–3.5)
LYMPHOCYTES NFR BLD: 48 % (ref 12–49)
MCH RBC QN AUTO: 30.2 PG (ref 26–34)
MCHC RBC AUTO-ENTMCNC: 33.9 G/DL (ref 30–36.5)
MCV RBC AUTO: 89.1 FL (ref 80–99)
MONOCYTES # BLD: 0.8 K/UL (ref 0–1)
MONOCYTES NFR BLD: 8 % (ref 5–13)
NEUTS SEG # BLD: 4.1 K/UL (ref 1.8–8)
NEUTS SEG NFR BLD: 44 % (ref 32–75)
NRBC # BLD: 0 K/UL (ref 0–0.01)
NRBC BLD-RTO: 0 PER 100 WBC
PLATELET # BLD AUTO: 211 K/UL (ref 150–400)
PMV BLD AUTO: 9.8 FL (ref 8.9–12.9)
POTASSIUM SERPL-SCNC: 3.7 MMOL/L (ref 3.5–5.1)
RBC # BLD AUTO: 3.58 M/UL (ref 4.1–5.7)
SODIUM SERPL-SCNC: 140 MMOL/L (ref 136–145)
WBC # BLD AUTO: 9.4 K/UL (ref 4.1–11.1)

## 2022-03-10 PROCEDURE — 65270000029 HC RM PRIVATE

## 2022-03-10 PROCEDURE — 80048 BASIC METABOLIC PNL TOTAL CA: CPT

## 2022-03-10 PROCEDURE — 74011250636 HC RX REV CODE- 250/636: Performed by: SURGERY

## 2022-03-10 PROCEDURE — 74011000250 HC RX REV CODE- 250: Performed by: SURGERY

## 2022-03-10 PROCEDURE — 94760 N-INVAS EAR/PLS OXIMETRY 1: CPT

## 2022-03-10 PROCEDURE — 77010033678 HC OXYGEN DAILY

## 2022-03-10 PROCEDURE — 85025 COMPLETE CBC W/AUTO DIFF WBC: CPT

## 2022-03-10 PROCEDURE — 36415 COLL VENOUS BLD VENIPUNCTURE: CPT

## 2022-03-10 PROCEDURE — 74011250637 HC RX REV CODE- 250/637: Performed by: SURGERY

## 2022-03-10 PROCEDURE — 74011250636 HC RX REV CODE- 250/636: Performed by: NURSE PRACTITIONER

## 2022-03-10 RX ORDER — OXYCODONE HYDROCHLORIDE 5 MG/1
5 TABLET ORAL
Qty: 15 TABLET | Refills: 0 | Status: SHIPPED | OUTPATIENT
Start: 2022-03-10 | End: 2022-03-13

## 2022-03-10 RX ORDER — POLYETHYLENE GLYCOL 3350 17 G/17G
17 POWDER, FOR SOLUTION ORAL DAILY
Status: DISCONTINUED | OUTPATIENT
Start: 2022-03-10 | End: 2022-03-11 | Stop reason: HOSPADM

## 2022-03-10 RX ORDER — ACETAMINOPHEN 325 MG/1
650 TABLET ORAL EVERY 6 HOURS
Qty: 56 TABLET | Refills: 0 | Status: SHIPPED | OUTPATIENT
Start: 2022-03-10 | End: 2022-03-17

## 2022-03-10 RX ADMIN — POLYETHYLENE GLYCOL 3350 17 G: 17 POWDER, FOR SOLUTION ORAL at 21:44

## 2022-03-10 RX ADMIN — ACETAMINOPHEN 325MG 650 MG: 325 TABLET ORAL at 09:18

## 2022-03-10 RX ADMIN — ENOXAPARIN SODIUM 40 MG: 100 INJECTION SUBCUTANEOUS at 09:17

## 2022-03-10 RX ADMIN — NALOXEGOL OXALATE 25 MG: 25 TABLET, FILM COATED ORAL at 09:17

## 2022-03-10 RX ADMIN — OXYCODONE 5 MG: 5 TABLET ORAL at 21:44

## 2022-03-10 RX ADMIN — OXYCODONE 5 MG: 5 TABLET ORAL at 18:15

## 2022-03-10 RX ADMIN — OXYCODONE 5 MG: 5 TABLET ORAL at 09:18

## 2022-03-10 RX ADMIN — SODIUM CHLORIDE, PRESERVATIVE FREE 10 ML: 5 INJECTION INTRAVENOUS at 22:00

## 2022-03-10 RX ADMIN — ACETAMINOPHEN 325MG 650 MG: 325 TABLET ORAL at 18:15

## 2022-03-10 RX ADMIN — ACETAMINOPHEN 325MG 650 MG: 325 TABLET ORAL at 01:11

## 2022-03-10 RX ADMIN — SODIUM CHLORIDE, POTASSIUM CHLORIDE, SODIUM LACTATE AND CALCIUM CHLORIDE 75 ML/HR: 600; 310; 30; 20 INJECTION, SOLUTION INTRAVENOUS at 05:43

## 2022-03-10 RX ADMIN — OXYCODONE 5 MG: 5 TABLET ORAL at 13:58

## 2022-03-10 RX ADMIN — ACETAMINOPHEN 325MG 650 MG: 325 TABLET ORAL at 14:01

## 2022-03-10 RX ADMIN — Medication 1 AMPULE: at 09:18

## 2022-03-10 RX ADMIN — OXYCODONE 5 MG: 5 TABLET ORAL at 04:20

## 2022-03-10 RX ADMIN — SODIUM CHLORIDE, PRESERVATIVE FREE 10 ML: 5 INJECTION INTRAVENOUS at 13:58

## 2022-03-10 RX ADMIN — Medication 1 AMPULE: at 21:44

## 2022-03-10 NOTE — PROGRESS NOTES
End of Shift Note    Bedside shift change report given to DONNA Wilson   (oncoming nurse) by Yoly May LPN (offgoing nurse). Report included the following information SBAR, Kardex, OR Summary, Procedure Summary, Intake/Output and MAR    Shift worked:  7am-7pm     Shift summary and any significant changes:     plan of care     Concerns for physician to address:  plan of care     Zone phone for oncoming shift:   [phone]       Activity:  Activity Level: Ambulate X (#)  Number times ambulated in hallways past shift: 1  Number of times OOB to chair past shift: 0    Cardiac:   Cardiac Monitoring: No      Cardiac Rhythm: Sinus Rhythm    Access:   Current line(s): PIV     Genitourinary:   Urinary status: voiding    Respiratory:   O2 Device: Nasal cannula  Chronic home O2 use?: NO  Incentive spirometer at bedside: YES       GI:  Last Bowel Movement Date: 03/06/22  Current diet:  ADULT DIET Regular; Low Fiber  Passing flatus: NO  Tolerating current diet: YES       Pain Management:   Patient states pain is manageable on current regimen: YES    Skin:  Josias Score: 21  Interventions: float heels, increase time out of bed and nutritional support     Patient Safety:  Fall Score:  Total Score: 1  Interventions: gripper socks, pt to call before getting OOB and stay with me (per policy)       Length of Stay:  Expected LOS: - - -  Actual LOS: [address] [facility], LPN

## 2022-03-10 NOTE — DISCHARGE INSTRUCTIONS
Marika Ferrell MD, FACS  Adenike Echavarria. MD Ariana Parrish MD Clelia Late, MD Fredie Georges, MD    [facility] Discharge Instructions for Colon Surgery Patients    1. Diagnosis: Sigmoid colectomy  2. Low fiber diet. 3. Do not drive while taking narcotic pain medications. 4. Leave surgical glue on incision. It may fall off on it's own. 5. May take a shower. 6. No lifting any objects weighing more than 15 pounds. Do not do any housework, such as vacuuming, scrubbing, etc for at least a month. 7. When you get tired during the day, take naps, as you need your rest.  8. Multiple bowel movements are normal each day for a while. 9. May walk as desired. May go up and down stairs. 10. Take pain medication as prescribed: (NO DRIVING WHILE ON PAIN MEDICATIONS). Oxycodone EVERY 4-6 HOURS AS NEEDED. 11. See me in the office in 10-14 days. Call as soon as discharged for an appointment 21 [phone]. IF SURGERY INVOLVED AN OSTOMY BAG, PLEASE BRING YOUR SUPPLIES TO YOUR 1ST VISIT! 12.  Call the Exchange [phone], if you have any questions or problems after office hours.             Porfirio Fierro [address], [address]  [address] [facility], [address] [facility]  [phone] · Fax [phone] [address] [facility], [address]  [phone] · Fax [phone] [facility], [address] [facility], [address]  [phone] · Fax [phone]

## 2022-03-10 NOTE — PROGRESS NOTES
Surgery NP Progress Note    Michael Barger  [id number]  male  46 y.o.  1970    s/p ROBOTIC ASSISTED LAPAROSCOPIC SIGMOID COLECTOMY WITH INTRA-OPERATIVE FLEXIBLE SIGMOIDOSCOPY, CYSTOSCOPY INSERTION BILATERAL URETERAL CATHETERS (E R A S) on 3/8/2022   Pt seen with Dr. Imelda Steward      Assessment:   Active Problems:    Diverticulitis (3/8/2022)      Expected post-op progress. Plan/Recommendations/Medical Decision Making:     - Mobilize with nursing and OOB to chair for meals  - Continue diet  - Pain management- Continue current pain control methods.   - VTE Prophylaxis: Lovenox  - Patient having bowel function but still a little distended. Will continue to monitor.   - Hopefully home tomorrow if continued bowel function and tolerating diet. Subjective:     Patient with minimal pain. Pain meds helping. Had several bowel movements. No nausea. Objective:     Blood pressure 128/79, pulse 60, temperature 97.8 °F (36.6 °C), resp. rate 18, height 5' 8\" (1.727 m), weight 74.3 kg (163 lb 12.8 oz), SpO2 97 %. Temp (24hrs), Av.4 °F (36.9 °C), Min:97.8 °F (36.6 °C), Max:99.4 °F (37.4 °C)      Pt resting in bed. NAD   Incisions CDI. Abd soft and appropriately tender. Some distention. SCDs for mechanical DVT proph while in bed     Body mass index is 24.91 kg/m². Reference: BMI greater than 30 is classified as obesity and greater than 40 is classified as morbid obesity.      Last 3 Recorded Weights in this Encounter    22 0630   Weight: 74.3 kg (163 lb 12.8 oz)         Mary Bruce, NP   MSN, APRN, FNP-C, CWOCN-AP, RNFA    03/10/22

## 2022-03-10 NOTE — PROGRESS NOTES
End of Shift Note    Bedside shift change report given to *** (oncoming nurse) by Antonietta Monroy LPN (offgoing nurse). Report included the following information SBAR, Kardex, OR Summary, Procedure Summary, Intake/Output, MAR and Recent Results    Shift worked:  7am-7pm     Shift summary and any significant changes:     Plan of care, increase activity, monitor I&O, pain control, toleration of diet     Concerns for physician to address:  Plan of care     Zone phone for oncoming shift:   [phone]       Activity:  Activity Level: Up ad michael  Number times ambulated in hallways past shift: 2  Number of times OOB to chair past shift: 1    Cardiac:   Cardiac Monitoring: No      Cardiac Rhythm: Sinus Rhythm    Access:   Current line(s): PIV     Genitourinary:   Urinary status: voiding    Respiratory:   O2 Device: None (Room air)  Chronic home O2 use?: NO  Incentive spirometer at bedside: YES       GI:  Last Bowel Movement Date: 03/10/22  Current diet:  ADULT DIET Regular; Low Fiber  Passing flatus: YES  Tolerating current diet: YES       Pain Management:   Patient states pain is manageable on current regimen: YES    Skin:  Josias Score: 22  Interventions: float heels, increase time out of bed and nutritional support     Patient Safety:  Fall Score:  Total Score: 1  Interventions: {fall interventions:24193}       Length of Stay:  Expected LOS: - - -  Actual LOS: 2      Antonietta Monroy LPN

## 2022-03-10 NOTE — PROGRESS NOTES
Transition of Care Plan:    RUR: 5% - low risk  Disposition: Home  Follow up appointments: Surgery  DME needed: No needs identified  Transportation at Discharge: wife  Dietra Sovereign or means to access home: wife has access  IM Medicare Letter: N/A  Is patient a BCPI-A Bundle:  No         If yes, was Bundle Letter given?:  N/A  Is patient a [place] and connected with the [place]? No             If yes, was [employer] transfer form completed and [facility] notified? Caregiver Contact: Julisa Dumont, wife, [phone]  Discharge Caregiver contacted prior to discharge? Pt is keeping updated. Care Conference needed?:   Not needed. Reason for Admission:  Diverticulitis               RUR Score:   5% - low risk                  Plan for utilizing home health: Not indicated     PCP: First and Last name:  Martell Erickson DO   Name of Practice:    Are you a current patient: Yes/No: yes   Approximate date of last visit: 6 months ago   Can you participate in a virtual visit with your PCP:  With assistance                    Current Advanced Directive/Advance Care Plan: Prior   Lacey Mukherjee (ACP) Conversation      Date of Conversation: 3/10/2022  Conducted with: Patient with Decision Making Capacity    Healthcare Decision Maker:   Julisa Dumont, wife, [phone]    Today we documented Decision Maker(s) consistent with Legal Next of Kin hierarchy. No ACP. Pt states his wife is his primary decision maker. Content/Action Overview:   Has NO ACP documents/care preferences - information provided, considering goals and options  Reviewed DNR/DNI and patient elects Full Code (Attempt Resuscitation)    Length of Voluntary ACP Conversation in minutes:  <16 minutes (Non-Billable)    Healthcare Decision Maker:   Julisa Dumont, wife, [phone]          Transition of Care Plan:  Home    CM met with patient at the bedside to complete initial assessment.  CM introduced role, verified demographics, and discussed discharge planning. Mr. Zora oCrley is a 46year old male admitted for diverticulitis. He is insured with [employer]. He uses the [employer] on [address]. Pt lives with his wife and 2 children in a 2-story house with 3 steps to enter. He is independent with ADLs and IADLs. Pt drives. Pt denies any DME or hx of HH, SNF, or Inpt Rehab. At time of discharge, pt's wife will transport him home. Pt states he might go home. States he has a follow up appt on Monday with Dr. Marvine Dubin. Pt could not remember time. CM contacted Dr. Rachel Smith office to find out about appt time and date. CM updated in CM one stop. Unit CM will continue to follow. Care Management Interventions  PCP Verified by CM: Yes  Mode of Transport at Discharge:  Other (see comment) (wife)  Transition of Care Consult (CM Consult): Discharge Planning  Discharge Durable Medical Equipment: No  Physical Therapy Consult: No  Occupational Therapy Consult: No  Speech Therapy Consult: No  Support Systems: Spouse/Significant Other  Confirm Follow Up Transport: Family  The Plan for Transition of Care is Related to the Following Treatment Goals : Home  The Patient and/or Patient Representative was Provided with a Choice of Provider and Agrees with the Discharge Plan?: Yes  Name of the Patient Representative Who was Provided with a Choice of Provider and Agrees with the Discharge Plan: patient  Freedom of Choice List was Provided with Basic Dialogue that Supports the Patient's Individualized Plan of Care/Goals, Treatment Preferences and Shares the Quality Data Associated with the Providers?: Yes  Discharge Location  Patient Expects to be Discharged to[de-identified] Home    Victor M Lynn RN, BSN, [address] [facility]  [phone]

## 2022-03-11 VITALS
SYSTOLIC BLOOD PRESSURE: 129 MMHG | HEART RATE: 67 BPM | WEIGHT: 163.8 LBS | RESPIRATION RATE: 18 BRPM | HEIGHT: 68 IN | OXYGEN SATURATION: 92 % | BODY MASS INDEX: 24.83 KG/M2 | TEMPERATURE: 97.9 F | DIASTOLIC BLOOD PRESSURE: 84 MMHG

## 2022-03-11 LAB
ANION GAP SERPL CALC-SCNC: 4 MMOL/L (ref 5–15)
BASOPHILS # BLD: 0 K/UL (ref 0–0.1)
BASOPHILS NFR BLD: 0 % (ref 0–1)
BUN SERPL-MCNC: 9 MG/DL (ref 6–20)
BUN/CREAT SERPL: 13 (ref 12–20)
CALCIUM SERPL-MCNC: 8.8 MG/DL (ref 8.5–10.1)
CHLORIDE SERPL-SCNC: 107 MMOL/L (ref 97–108)
CO2 SERPL-SCNC: 28 MMOL/L (ref 21–32)
CREAT SERPL-MCNC: 0.7 MG/DL (ref 0.7–1.3)
DIFFERENTIAL METHOD BLD: ABNORMAL
EOSINOPHIL # BLD: 0.1 K/UL (ref 0–0.4)
EOSINOPHIL NFR BLD: 2 % (ref 0–7)
ERYTHROCYTE [DISTWIDTH] IN BLOOD BY AUTOMATED COUNT: 12.9 % (ref 11.5–14.5)
GLUCOSE SERPL-MCNC: 93 MG/DL (ref 65–100)
HCT VFR BLD AUTO: 36 % (ref 36.6–50.3)
HGB BLD-MCNC: 11.9 G/DL (ref 12.1–17)
IMM GRANULOCYTES # BLD AUTO: 0 K/UL (ref 0–0.04)
IMM GRANULOCYTES NFR BLD AUTO: 0 % (ref 0–0.5)
LYMPHOCYTES # BLD: 3.5 K/UL (ref 0.8–3.5)
LYMPHOCYTES NFR BLD: 49 % (ref 12–49)
MCH RBC QN AUTO: 30.3 PG (ref 26–34)
MCHC RBC AUTO-ENTMCNC: 33.1 G/DL (ref 30–36.5)
MCV RBC AUTO: 91.6 FL (ref 80–99)
MONOCYTES # BLD: 0.7 K/UL (ref 0–1)
MONOCYTES NFR BLD: 10 % (ref 5–13)
NEUTS SEG # BLD: 2.8 K/UL (ref 1.8–8)
NEUTS SEG NFR BLD: 39 % (ref 32–75)
NRBC # BLD: 0 K/UL (ref 0–0.01)
NRBC BLD-RTO: 0 PER 100 WBC
PLATELET # BLD AUTO: 232 K/UL (ref 150–400)
PMV BLD AUTO: 9.6 FL (ref 8.9–12.9)
POTASSIUM SERPL-SCNC: 3.6 MMOL/L (ref 3.5–5.1)
RBC # BLD AUTO: 3.93 M/UL (ref 4.1–5.7)
SODIUM SERPL-SCNC: 139 MMOL/L (ref 136–145)
WBC # BLD AUTO: 7.2 K/UL (ref 4.1–11.1)

## 2022-03-11 PROCEDURE — 80048 BASIC METABOLIC PNL TOTAL CA: CPT

## 2022-03-11 PROCEDURE — 85025 COMPLETE CBC W/AUTO DIFF WBC: CPT

## 2022-03-11 PROCEDURE — 36415 COLL VENOUS BLD VENIPUNCTURE: CPT

## 2022-03-11 PROCEDURE — 74011250637 HC RX REV CODE- 250/637: Performed by: SURGERY

## 2022-03-11 PROCEDURE — 74011250636 HC RX REV CODE- 250/636: Performed by: SURGERY

## 2022-03-11 PROCEDURE — 74011000250 HC RX REV CODE- 250: Performed by: SURGERY

## 2022-03-11 RX ADMIN — ACETAMINOPHEN 325MG 650 MG: 325 TABLET ORAL at 06:19

## 2022-03-11 RX ADMIN — SODIUM CHLORIDE, PRESERVATIVE FREE 10 ML: 5 INJECTION INTRAVENOUS at 06:00

## 2022-03-11 RX ADMIN — SODIUM CHLORIDE, POTASSIUM CHLORIDE, SODIUM LACTATE AND CALCIUM CHLORIDE 75 ML/HR: 600; 310; 30; 20 INJECTION, SOLUTION INTRAVENOUS at 06:19

## 2022-03-11 RX ADMIN — OXYCODONE 5 MG: 5 TABLET ORAL at 03:22

## 2022-03-11 RX ADMIN — NALOXEGOL OXALATE 25 MG: 25 TABLET, FILM COATED ORAL at 08:58

## 2022-03-11 RX ADMIN — ACETAMINOPHEN 325MG 650 MG: 325 TABLET ORAL at 00:04

## 2022-03-11 RX ADMIN — ACETAMINOPHEN 325MG 650 MG: 325 TABLET ORAL at 08:58

## 2022-03-11 NOTE — PROGRESS NOTES
Problem: Pressure Injury - Risk of  Goal: *Prevention of pressure injury  Description: Document Josias Scale and appropriate interventions in the flowsheet. Outcome: Resolved/Met  Note: Pressure Injury Interventions: Activity Interventions: Increase time out of bed         Nutrition Interventions: Document food/fluid/supplement intake                     Problem: Patient Education: Go to Patient Education Activity  Goal: Patient/Family Education  Outcome: Resolved/Met     Problem: Falls - Risk of  Goal: *Absence of Falls  Description: Document Murali Fall Risk and appropriate interventions in the flowsheet.   Outcome: Resolved/Met  Note: Fall Risk Interventions:            Medication Interventions: Patient to call before getting OOB,Teach patient to arise slowly                   Problem: Patient Education: Go to Patient Education Activity  Goal: Patient/Family Education  Outcome: Resolved/Met

## 2022-03-11 NOTE — PROGRESS NOTES
In a CM perspective, pt is ready for discharge. RN made aware. Transition of Care Plan:     RUR: 5% - low risk  Disposition: Home  Follow up appointments: Surgery, PCP (prn)   DME needed: No needs identified  Transportation at Discharge: wife  Kari Hager or means to access home: wife has access  IM Medicare Letter: N/A  Is patient a BCPI-A Bundle:  No                    If yes, was Bundle Letter given?:  N/A  Is patient a  and connected with the [place]? No             If yes, was [employer] transfer form completed and VA notified? Caregiver Contact: Bassam Patrick, wife, [phone]  Discharge Caregiver contacted prior to discharge? Pt has updated. Care Conference needed?:   Not needed. Initial Note 08:30 am: CM aware of discharge. CM at bedside and discussed discharge planning. Pt verbalized understanding and have no questions for CM. Pt has updated his wife and will call her to pick him up. In a CM perspective, pt is ready for discharge. RN made aware. Care Management Interventions  PCP Verified by CM: Yes  Mode of Transport at Discharge:  Other (see comment) (wife)  Transition of Care Consult (CM Consult): Discharge Planning  Discharge Durable Medical Equipment: No  Physical Therapy Consult: No  Occupational Therapy Consult: No  Speech Therapy Consult: No  Support Systems: Spouse/Significant Other  Confirm Follow Up Transport: Family  The Plan for Transition of Care is Related to the Following Treatment Goals : Home  The Patient and/or Patient Representative was Provided with a Choice of Provider and Agrees with the Discharge Plan?: Yes  Name of the Patient Representative Who was Provided with a Choice of Provider and Agrees with the Discharge Plan: patient  Freedom of Choice List was Provided with Basic Dialogue that Supports the Patient's Individualized Plan of Care/Goals, Treatment Preferences and Shares the Quality Data Associated with the Providers?: Yes  Discharge Location  Patient Expects to be Discharged to[de-identified] Home    Nataliia Hyde RN, BSN, [address] [facility] Care Manager  [phone]

## 2022-03-15 NOTE — DISCHARGE SUMMARY
Discharge Summary     Patient: Micheline Rai MRN: [id number]  SSN: [id number]    YOB: 1970  Age: 46 y.o. Sex: male       Admit Date: 3/8/2022    Discharge Date: 3/11/22      Admission Diagnoses: Diverticulitis [K57.92]    Discharge Diagnoses:   Problem List as of 3/11/2022 Date Reviewed: 3/13/2014          Codes Class Noted - Resolved    Diverticular disease of large intestine with complication [id number]: F50.32  ICD-9-CM: 562.10  9/14/2021 - Present        Diverticulitis ICD-10-CM: K57.92  ICD-9-CM: 562.11  3/8/2022 - Present               Discharge Condition: Good    Hospital Course: Normal postoperative course    Disposition: home    Discharge Medications:   Cannot display discharge medications since this patient is not currently admitted. Activity: Activity as tolerated  Diet: Regular Diet  Wound Care: Keep wound clean and dry       Follow-up Appointments   Procedures    FOLLOW UP VISIT Appointment in: Two Weeks     Standing Status:   Standing     Number of Occurrences:   1     Order Specific Question:   Appointment in     Answer:    Two Weeks       Signed By: Mitch Lynn MD     March 15, 2022

## 2022-03-19 PROBLEM — K57.92 DIVERTICULITIS: Status: ACTIVE | Noted: 2022-03-08

## 2022-03-20 PROBLEM — K57.30 DIVERTICULAR DISEASE OF LARGE INTESTINE WITH COMPLICATION: Status: ACTIVE | Noted: 2021-09-14

## 2022-10-05 ENCOUNTER — TRANSCRIBE ORDER (OUTPATIENT)
Dept: SCHEDULING | Age: 52
End: 2022-10-05

## 2022-10-05 ENCOUNTER — OFFICE VISIT (OUTPATIENT)
Dept: INTERNAL MEDICINE CLINIC | Age: 52
End: 2022-10-05
Payer: COMMERCIAL

## 2022-10-05 VITALS
DIASTOLIC BLOOD PRESSURE: 78 MMHG | HEART RATE: 76 BPM | WEIGHT: 169 LBS | RESPIRATION RATE: 20 BRPM | HEIGHT: 68 IN | OXYGEN SATURATION: 96 % | SYSTOLIC BLOOD PRESSURE: 121 MMHG | TEMPERATURE: 97.8 F | BODY MASS INDEX: 25.61 KG/M2

## 2022-10-05 DIAGNOSIS — Z13.220 SCREENING FOR HYPERLIPIDEMIA: ICD-10-CM

## 2022-10-05 DIAGNOSIS — Z76.89 ENCOUNTER TO ESTABLISH CARE WITH NEW DOCTOR: ICD-10-CM

## 2022-10-05 DIAGNOSIS — Z11.59 NEED FOR HEPATITIS C SCREENING TEST: ICD-10-CM

## 2022-10-05 DIAGNOSIS — R10.12 LEFT UPPER QUADRANT ABDOMINAL PAIN: Primary | ICD-10-CM

## 2022-10-05 DIAGNOSIS — R73.09 ELEVATED HEMOGLOBIN A1C: ICD-10-CM

## 2022-10-05 DIAGNOSIS — R10.9 ABDOMINAL PAIN: Primary | ICD-10-CM

## 2022-10-05 PROCEDURE — 99204 OFFICE O/P NEW MOD 45 MIN: CPT | Performed by: NURSE PRACTITIONER

## 2022-10-05 NOTE — PROGRESS NOTES
Bob Anderson (: 1970) is a 46 y.o. male is here for evaluation of the following chief complaint(s): Establish Care (Previous PCP with [facility] ) and Side Pain (Had colon surgery in 2022 - diverticulitis)        Subjective/Objective:   Dao Sommer was seen today for Establish Care (Previous PCP with [facility] ) and Side Pain (Had colon surgery in 2022 - diverticulitis)  Pt presents to the clinic to establish care and has current complaint of L quad abdominal pain that comes and goes described as aching. Pt states more intense over the past week. Not associated with eating but worse while sitting and decreased with standing. Pt denies f/n/v/d/c or urinary complaints. Pt denies chest pain or sob. Past surgical hx significant for severe diverticulitis necessitating a right sided colon resection of 7 inches of colon. Pt states the surgeon at the time examined the left side as well and although has diverticulitis present not severe enough for resection. Review of Systems   Constitutional: Negative. HENT: Negative. Eyes: Negative. Respiratory: Negative. Cardiovascular: Negative. Gastrointestinal:  Positive for abdominal pain. Negative for constipation, diarrhea, nausea and vomiting. Genitourinary: Negative. Musculoskeletal: Negative. Skin: Negative. Neurological: Negative. Endo/Heme/Allergies: Negative. Psychiatric/Behavioral: Negative. Physical Exam  Vitals reviewed. Constitutional:       Appearance: Normal appearance. He is normal weight. HENT:      Head: Normocephalic and atraumatic. Right Ear: Tympanic membrane, ear canal and external ear normal.      Left Ear: Tympanic membrane, ear canal and external ear normal.      Ears:      Comments: Pt denies any difficulty hearing. Nose: Nose normal.      Mouth/Throat:      Lips: Pink. Mouth: Mucous membranes are moist.      Pharynx: Oropharynx is clear. Comments: Denies any acute dental pain. Eyes:      General: Lids are normal.      Extraocular Movements: Extraocular movements intact. Conjunctiva/sclera: Conjunctivae normal.      Comments: Denies any acute vision problems. Neck:      Thyroid: No thyroid mass, thyromegaly or thyroid tenderness. Vascular: No carotid bruit or JVD. Trachea: Trachea and phonation normal.   Cardiovascular:      Rate and Rhythm: Normal rate and regular rhythm. Pulses: Normal pulses. Radial pulses are 2+ on the right side. Heart sounds: Normal heart sounds, S1 normal and S2 normal.   Pulmonary:      Effort: Pulmonary effort is normal.      Breath sounds: Normal breath sounds and air entry. Abdominal:      General: Abdomen is flat. Bowel sounds are normal.      Palpations: Abdomen is soft. There is no hepatomegaly or splenomegaly. Tenderness: There is no abdominal tenderness. There is no right CVA tenderness or left CVA tenderness. Hernia: No hernia is present. Musculoskeletal:         General: Normal range of motion. Cervical back: Full passive range of motion without pain and normal range of motion. No spinous process tenderness or muscular tenderness. Right lower leg: No edema. Left lower leg: No edema. Lymphadenopathy:      Cervical: No cervical adenopathy. Skin:     General: Skin is warm and dry. Capillary Refill: Capillary refill takes less than 2 seconds. Neurological:      Mental Status: He is alert and oriented to person, place, and time. Psychiatric:         Mood and Affect: Mood normal.         Behavior: Behavior normal.         Thought Content:  Thought content normal.         Judgment: Judgment normal.        /78 (BP 1 Location: Right arm, BP Patient Position: Sitting, BP Cuff Size: Adult)   Pulse 76   Temp 97.8 °F (36.6 °C) (Temporal)   Resp 20   Ht 5' 8\" (1.727 m)   Wt 169 lb (76.7 kg)   SpO2 96%   BMI 25.70 kg/m²      Office Visit on 10/05/2022   Component Date Value Ref Range Status    WBC 10/05/2022 8.3  3.4 - 10.8 x10E3/uL Final    RBC 10/05/2022 5.31  4.14 - 5.80 x10E6/uL Final    HGB 10/05/2022 15.4  13.0 - 17.7 g/dL Final    HCT 10/05/2022 47.5  37.5 - 51.0 % Final    MCV 10/05/2022 90  79 - 97 fL Final    MCH 10/05/2022 29.0  26.6 - 33.0 pg Final    MCHC 10/05/2022 32.4  31.5 - 35.7 g/dL Final    RDW 10/05/2022 13.1  11.6 - 15.4 % Final    PLATELET 80/48/9995 725  150 - 450 x10E3/uL Final    NEUTROPHILS 10/05/2022 51  Not Estab. % Final    Lymphocytes 10/05/2022 38  Not Estab. % Final    MONOCYTES 10/05/2022 9  Not Estab. % Final    EOSINOPHILS 10/05/2022 1  Not Estab. % Final    BASOPHILS 10/05/2022 1  Not Estab. % Final    ABS. NEUTROPHILS 10/05/2022 4.3  1.4 - 7.0 x10E3/uL Final    Abs Lymphocytes 10/05/2022 3.1  0.7 - 3.1 x10E3/uL Final    ABS. MONOCYTES 10/05/2022 0.7  0.1 - 0.9 x10E3/uL Final    ABS. EOSINOPHILS 10/05/2022 0.1  0.0 - 0.4 x10E3/uL Final    ABS. BASOPHILS 10/05/2022 0.0  0.0 - 0.2 x10E3/uL Final    IMMATURE GRANULOCYTES 10/05/2022 0  Not Estab. % Final    ABS. IMM. GRANS. 10/05/2022 0.0  0.0 - 0.1 x10E3/uL Final    Glucose 10/05/2022 98  70 - 99 mg/dL Final                  **Please note reference interval change**    BUN 10/05/2022 11  6 - 24 mg/dL Final    Creatinine 10/05/2022 1.01  0.76 - 1.27 mg/dL Final    eGFR 10/05/2022 89  >59 mL/min/1.73 Final    BUN/Creatinine ratio 10/05/2022 11  9 - 20 Final    Sodium 10/05/2022 141  134 - 144 mmol/L Final    Potassium 10/05/2022 4.2  3.5 - 5.2 mmol/L Final    Chloride 10/05/2022 101  96 - 106 mmol/L Final    CO2 10/05/2022 26  20 - 29 mmol/L Final    Calcium 10/05/2022 10.0  8.7 - 10.2 mg/dL Final    Protein, total 10/05/2022 7.5  6.0 - 8.5 g/dL Final    Albumin 10/05/2022 5.0 (A)  3.8 - 4.9 g/dL Final    GLOBULIN, TOTAL 10/05/2022 2.5  1.5 - 4.5 g/dL Final    A-G Ratio 10/05/2022 2.0  1.2 - 2.2 Final    Bilirubin, total 10/05/2022 0.6  0.0 - 1.2 mg/dL Final    Alk. phosphatase 10/05/2022 45  44 - 121 IU/L Final    AST (SGOT) 10/05/2022 24  0 - 40 IU/L Final    ALT (SGPT) 10/05/2022 27  0 - 44 IU/L Final    Cholesterol, total 10/05/2022 265 (A)  100 - 199 mg/dL Final    Triglyceride 10/05/2022 83  0 - 149 mg/dL Final    HDL Cholesterol 10/05/2022 60  >39 mg/dL Final    VLDL, calculated 10/05/2022 14  5 - 40 mg/dL Final    LDL, calculated 10/05/2022 191 (A)  0 - 99 mg/dL Final    Comment 10/05/2022 Comment   Final    Comment: Possible Familial Hypercholesterolemia. FH should be suspected when  fasting LDL cholesterol is above 189 mg/dL or non-HDL cholesterol  is above 219 mg/dL. A family history of high cholesterol and heart  disease in 1st degree relatives should be collected. J Clin Lipidol  2011;5:133-140      Hemoglobin A1c 10/05/2022 5.9 (A)  4.8 - 5.6 % Final    Comment:          Prediabetes: 5.7 - 6.4           Diabetes: >6.4           Glycemic control for adults with diabetes: <7.0      Estimated average glucose 10/05/2022 123  mg/dL Final    HCV Ab 10/05/2022 <0.1  0.0 - 0.9 s/co ratio Final    HCV Interpretation 10/05/2022 Comment   Final    Comment: Negative  Not infected with HCV, unless recent infection is suspected or other  evidence exists to indicate HCV infection. INTERPRETATION 10/05/2022 Note   Final    Supplemental report is available. Past Surgical History:   Procedure Laterality Date    HX HEENT Left 02/2022    tooth pulled was cracked-bottom back left    HX PARTIAL COLECTOMY      HX UROLOGICAL  2018    removed cancer from bladder        Past Medical History:   Diagnosis Date    Cancer ([facility] [address] 2018    bladder cancer/[facility]    Diverticulitis     Eczema         No current outpatient medications     Assessment/Plan:     The above diagnosis is an acute on chronic problem. We discussed expected course, resolution, and complications of diagnosis in detail.   I advised him to call back or return to office if symptoms worsen/change/persist. ICD-10-CM ICD-9-CM    1. Left upper quadrant abdominal pain  R10.12 789.02 CBC WITH AUTOMATED DIFF      METABOLIC PANEL, COMPREHENSIVE      CT ABD W ORAL CONT      2. Encounter to establish care with new doctor  Z76.89 V65.8 TSH 3RD GENERATION      3. Elevated hemoglobin A1c  R73.09 790.29 HEMOGLOBIN A1C WITH EAG      4. Need for hepatitis C screening test  Z11.59 V73.89 HEPATITIS C AB, RFLX TO QT BY PCR      5. Screening for hyperlipidemia  Z13.220 V77.91 LIPID PANEL         Return in about 4 weeks (around 11/2/2022). An electronic signature was used to authenticate this note.   -- Lissette Abebe, LUDIN

## 2022-10-05 NOTE — PATIENT INSTRUCTIONS
It was a pleasure to meet you today. Please have labs drawn at [facility] as discussed and CT done at [facility]. Return to the clinic in 1-2 months to review labs. Appt can also be virtual. Return sooner for any new or worsening symptoms. For severe pain and vomiting fever go to the ER. Thank you for choosing [address].     Lissette Abebe NP

## 2022-10-05 NOTE — PROGRESS NOTES
Chief Complaint   Patient presents with    Establish Care     Previous PCP with [facility]     Side Pain     Had colon surgery in March 2022 - diverticulitis

## 2022-10-06 LAB
ALBUMIN SERPL-MCNC: 5 G/DL (ref 3.8–4.9)
ALBUMIN/GLOB SERPL: 2 {RATIO} (ref 1.2–2.2)
ALP SERPL-CCNC: 45 IU/L (ref 44–121)
ALT SERPL-CCNC: 27 IU/L (ref 0–44)
AST SERPL-CCNC: 24 IU/L (ref 0–40)
BASOPHILS # BLD AUTO: 0 X10E3/UL (ref 0–0.2)
BASOPHILS NFR BLD AUTO: 1 %
BILIRUB SERPL-MCNC: 0.6 MG/DL (ref 0–1.2)
BUN SERPL-MCNC: 11 MG/DL (ref 6–24)
BUN/CREAT SERPL: 11 (ref 9–20)
CALCIUM SERPL-MCNC: 10 MG/DL (ref 8.7–10.2)
CHLORIDE SERPL-SCNC: 101 MMOL/L (ref 96–106)
CHOLEST SERPL-MCNC: 265 MG/DL (ref 100–199)
CO2 SERPL-SCNC: 26 MMOL/L (ref 20–29)
COMMENT:: ABNORMAL
CREAT SERPL-MCNC: 1.01 MG/DL (ref 0.76–1.27)
EGFR: 89 ML/MIN/1.73
EOSINOPHIL # BLD AUTO: 0.1 X10E3/UL (ref 0–0.4)
EOSINOPHIL NFR BLD AUTO: 1 %
ERYTHROCYTE [DISTWIDTH] IN BLOOD BY AUTOMATED COUNT: 13.1 % (ref 11.6–15.4)
EST. AVERAGE GLUCOSE BLD GHB EST-MCNC: 123 MG/DL
GLOBULIN SER CALC-MCNC: 2.5 G/DL (ref 1.5–4.5)
GLUCOSE SERPL-MCNC: 98 MG/DL (ref 70–99)
HBA1C MFR BLD: 5.9 % (ref 4.8–5.6)
HCT VFR BLD AUTO: 47.5 % (ref 37.5–51)
HCV AB S/CO SERPL IA: <0.1 S/CO RATIO (ref 0–0.9)
HCV AB SERPL QL IA: NORMAL
HDLC SERPL-MCNC: 60 MG/DL
HGB BLD-MCNC: 15.4 G/DL (ref 13–17.7)
IMM GRANULOCYTES # BLD AUTO: 0 X10E3/UL (ref 0–0.1)
IMM GRANULOCYTES NFR BLD AUTO: 0 %
IMP & REVIEW OF LAB RESULTS: NORMAL
LDLC SERPL CALC-MCNC: 191 MG/DL (ref 0–99)
LYMPHOCYTES # BLD AUTO: 3.1 X10E3/UL (ref 0.7–3.1)
LYMPHOCYTES NFR BLD AUTO: 38 %
MCH RBC QN AUTO: 29 PG (ref 26.6–33)
MCHC RBC AUTO-ENTMCNC: 32.4 G/DL (ref 31.5–35.7)
MCV RBC AUTO: 90 FL (ref 79–97)
MONOCYTES # BLD AUTO: 0.7 X10E3/UL (ref 0.1–0.9)
MONOCYTES NFR BLD AUTO: 9 %
NEUTROPHILS # BLD AUTO: 4.3 X10E3/UL (ref 1.4–7)
NEUTROPHILS NFR BLD AUTO: 51 %
PLATELET # BLD AUTO: 325 X10E3/UL (ref 150–450)
POTASSIUM SERPL-SCNC: 4.2 MMOL/L (ref 3.5–5.2)
PROT SERPL-MCNC: 7.5 G/DL (ref 6–8.5)
RBC # BLD AUTO: 5.31 X10E6/UL (ref 4.14–5.8)
SODIUM SERPL-SCNC: 141 MMOL/L (ref 134–144)
TRIGL SERPL-MCNC: 83 MG/DL (ref 0–149)
VLDLC SERPL CALC-MCNC: 14 MG/DL (ref 5–40)
WBC # BLD AUTO: 8.3 X10E3/UL (ref 3.4–10.8)

## 2022-10-17 ENCOUNTER — HOSPITAL ENCOUNTER (OUTPATIENT)
Dept: CT IMAGING | Age: 52
Discharge: HOME | End: 2022-10-17
Attending: NURSE PRACTITIONER
Payer: COMMERCIAL

## 2022-10-17 DIAGNOSIS — R10.9 ABDOMINAL PAIN: ICD-10-CM

## 2022-10-17 PROCEDURE — 74011000250 HC RX REV CODE- 250: Performed by: NURSE PRACTITIONER

## 2022-10-17 PROCEDURE — 74011000636 HC RX REV CODE- 636: Performed by: NURSE PRACTITIONER

## 2022-10-17 PROCEDURE — 74160 CT ABDOMEN W/CONTRAST: CPT

## 2022-10-17 RX ORDER — BARIUM SULFATE 20 MG/ML
900 SUSPENSION ORAL
Status: COMPLETED | OUTPATIENT
Start: 2022-10-17 | End: 2022-10-17

## 2022-10-17 RX ADMIN — BARIUM SULFATE 900 ML: 21 SUSPENSION ORAL at 12:55

## 2022-10-17 RX ADMIN — IOPAMIDOL 100 ML: 755 INJECTION, SOLUTION INTRAVENOUS at 12:55

## 2022-11-04 ENCOUNTER — VIRTUAL VISIT (OUTPATIENT)
Dept: INTERNAL MEDICINE CLINIC | Age: 52
End: 2022-11-04
Payer: COMMERCIAL

## 2022-11-04 DIAGNOSIS — R73.03 PREDIABETES: ICD-10-CM

## 2022-11-04 DIAGNOSIS — R10.9 ABDOMINAL PAIN, UNSPECIFIED ABDOMINAL LOCATION: Primary | ICD-10-CM

## 2022-11-04 DIAGNOSIS — E78.5 HYPERLIPIDEMIA, UNSPECIFIED HYPERLIPIDEMIA TYPE: ICD-10-CM

## 2022-11-04 PROCEDURE — 99214 OFFICE O/P EST MOD 30 MIN: CPT | Performed by: NURSE PRACTITIONER

## 2022-11-04 RX ORDER — ATORVASTATIN CALCIUM 10 MG/1
10 TABLET, FILM COATED ORAL DAILY
Qty: 90 TABLET | Refills: 0 | Status: SHIPPED | OUTPATIENT
Start: 2022-11-04 | End: 2023-02-02

## 2022-11-04 NOTE — PROGRESS NOTES
Michael Barger (: 1970) is a 46 y.o. male, established patient, here for evaluation of the following chief complaint(s):   Labs (Discuss lab results)       ASSESSMENT/PLAN:  Below is the assessment and plan developed based on review of pertinent history, labs, studies, and medications. 1. Abdominal pain, unspecified abdominal location  -     NM HEPATOBILIARY DUCT SCAN; Future  -     REFERRAL TO GASTROENTEROLOGY  2. Hyperlipidemia, unspecified hyperlipidemia type  3. Prediabetes    Return in about 3 months (around 2023). SUBJECTIVE/OBJECTIVE:  HPI  Pt here to discuss lab results and his continued RUQ abdominal pain. Pt denies n/v/d/or fever . Pt denies pain being associated with food intake. Pt has a GI doctor to call. HIDA scan order placed. CT scan unrevealing as to cause of pain. Discussed elevated cholesterol and elevated A1c Pt has hyperlipidemia and prediabetes. Discussed low fat low chol low carb diet and increasing exercise. Lipitor prescribed. Will recheck in 3 months.   Review of Systems   RUQ abd pain   No data recorded     Physical Exam    [INSTRUCTIONS:  \"[x]\" Indicates a positive item  \"[]\" Indicates a negative item  -- DELETE ALL ITEMS NOT EXAMINED]    Constitutional: [x] Appears well-developed and well-nourished [x] No apparent distress      [] Abnormal -     Mental status: [x] Alert and awake  [x] Oriented to person/place/time [x] Able to follow commands    [] Abnormal -     Eyes:   EOM    [x]  Normal    [] Abnormal -   Sclera  [x]  Normal    [] Abnormal -          Discharge [x]  None visible   [] Abnormal -     HENT: [x] Normocephalic, atraumatic  [] Abnormal -   [x] Mouth/Throat: Mucous membranes are moist    External Ears [x] Normal  [] Abnormal -    Neck: [x] No visualized mass [] Abnormal -     Pulmonary/Chest: [x] Respiratory effort normal   [x] No visualized signs of difficulty breathing or respiratory distress        [] Abnormal -      Musculoskeletal:   [x] Normal gait with no signs of ataxia         [x] Normal range of motion of neck        [] Abnormal -     Neurological:        [x] No Facial Asymmetry (Cranial nerve 7 motor function) (limited exam due to video visit)          [x] No gaze palsy        [] Abnormal -          Skin:        [x] No significant exanthematous lesions or discoloration noted on facial skin         [] Abnormal -            Psychiatric:       [x] Normal Affect [] Abnormal -        [x] No Hallucinations    Other pertinent observable physical exam findings:-    On this date 11/04/2022 I have spent 30 minutes reviewing previous notes, test results and face to face (virtual) with the patient discussing the diagnosis and importance of compliance with the treatment plan as well as documenting on the day of the visit. Swati Stauffer, was evaluated through a synchronous (real-time) audio-video encounter. The patient (or guardian if applicable) is aware that this is a billable service, which includes applicable co-pays. This Virtual Visit was conducted with patient's (and/or legal guardian's) consent. The visit was conducted pursuant to the emergency declaration under the [address] [place] [address], [address] [place] [address] authority and the [employer] Act. Patient identification was verified, and a caregiver was present when appropriate. The patient was located at: Home: [address] [facility]  The provider was located at: [facility]App[facility] [address] [place] [address] [address]       An electronic signature was used to authenticate this note.   -- Chang Watson NP

## 2022-11-04 NOTE — PATIENT INSTRUCTIONS
Please follow up in 3 months to recheck cholesterol. Return sooner for any new or worsening symptoms.     Lieutenant Rosa BIN

## 2022-11-21 ENCOUNTER — VIRTUAL VISIT (OUTPATIENT)
Dept: INTERNAL MEDICINE CLINIC | Age: 52
End: 2022-11-21
Payer: COMMERCIAL

## 2022-11-21 DIAGNOSIS — E78.5 HYPERLIPIDEMIA, UNSPECIFIED HYPERLIPIDEMIA TYPE: ICD-10-CM

## 2022-11-21 DIAGNOSIS — U07.1 COVID-19: Primary | ICD-10-CM

## 2022-11-21 PROBLEM — L71.9 ROSACEA: Status: ACTIVE | Noted: 2022-11-21

## 2022-11-21 PROCEDURE — 99214 OFFICE O/P EST MOD 30 MIN: CPT | Performed by: FAMILY MEDICINE

## 2022-11-21 RX ORDER — DOXYCYCLINE HYCLATE 100 MG
100 TABLET ORAL DAILY
COMMUNITY
Start: 2022-11-07

## 2022-11-21 NOTE — PROGRESS NOTES
Chief Complaint   Patient presents with    Positive For Covid-19       Patient is aware that this is a Virtual Visit or Phone Call Only doctor's visit. Patient has not been out of the country in (14 months), NO diarrhea, NO cough, NO chest conjestion, NO temp. Pt has not been around anyone with these symptoms. Health Maintenance reviewed. I have reviewed the patient's medical history in detail and updated the computerized patient record. Have you been to the ER, urgent care clinic since your last visit? No Hospitalized since your last visit? No     2. Have you seen or consulted any other health care providers outside of the [address] [place] [address] since your last visit? No  Include any pap smears or colon screening. Encouraged pt to discuss pt's wishes with spouse/partner/family and bring them in the next appt to follow thru with the Advanced Directive      No flowsheet data found. 3 most recent PHQ Screens 10/5/2022   Little interest or pleasure in doing things Not at all   Feeling down, depressed, irritable, or hopeless Not at all   Total Score PHQ 2 0       Abuse Screening Questionnaire 10/5/2022   Do you ever feel afraid of your partner? N   Are you in a relationship with someone who physically or mentally threatens you? N   Is it safe for you to go home?  Y       ADL Assessment 10/5/2022   Feeding yourself No Help Needed   Getting from bed to chair No Help Needed   Getting dressed No Help Needed   Bathing or showering No Help Needed   Walk across the room (includes cane/walker) No Help Needed   Using the telphone No Help Needed   Taking your medications No Help Needed   Preparing meals No Help Needed   Managing money (expenses/bills) No Help Needed   Moderately strenuous housework (laundry) No Help Needed   Shopping for personal items (toiletries/medicines) No Help Needed   Shopping for groceries No Help Needed   Driving No Help Needed   Climbing a flight of stairs No Help Needed Getting to places beyond walking distances No Help Needed

## 2022-11-21 NOTE — PROGRESS NOTES
Subjective:   Diamond King is a 46 y.o. male who complains of congestion, cough described as dry, and headache for 1-2 days, gradually worsening since that time. He denies a history of fevers, nausea, shortness of breath, and wheezing. 3 covid vaccinations  Evaluation to date: home covid test +. Treatment to date: none. Patient does not smoke cigarettes. Relevant PMH: No pertinent additional PMH. Allergies   Allergen Reactions    Pcn [Penicillins] Hives     As child, hives on arms-treated by stopping med  Keflex=unsure         Patient Active Problem List    Diagnosis Date Noted    Diverticulitis 03/08/2022    Diverticular disease of large intestine with complication 51/99/6056     Current Outpatient Medications   Medication Sig Dispense Refill    atorvastatin (LIPITOR) 10 mg tablet Take 1 Tablet by mouth daily for 90 days. 90 Tablet 0       Social History     Tobacco Use    Smoking status: Never    Smokeless tobacco: Never   Substance Use Topics    Alcohol use: Yes     Alcohol/week: 20.0 standard drinks     Types: 20 Cans of beer per week     Comment: couple of beers a night        Review of Systems  Pertinent items are noted in HPI. Rx doxycycline for skin condition    Objective: There were no vitals taken for this visit. General:  alert, cooperative, no distress   Eyes: neg     Assessment/Plan:   viral upper respiratory illness/covid  RTC prn. Discussed diagnosis and treatment of viral URIs. Discussed the importance of avoiding unnecessary antibiotic therapy. ICD-10-CM ICD-9-CM    1. COVID-19  U07.1 079.89 nirmatrelvir-ritonavir (PAXLOVID) 300 mg (150 mg x 2)-100 mg      2. Hyperlipidemia, unspecified hyperlipidemia type  E78.5 272.4           Orders Placed This Encounter    doxycycline (VIBRA-TABS) 100 mg tablet    nirmatrelvir-ritonavir (PAXLOVID) 300 mg (150 mg x 2)-100 mg   . Patient was instructed on the limits of making a diagnosis at this visit.   Was instructed to call us or go to the emergency room if the symptoms increased or if new symptoms appeared. Follow-up and Dispositions    Return if symptoms worsen or fail to improve.

## 2023-02-21 ENCOUNTER — OFFICE VISIT (OUTPATIENT)
Dept: INTERNAL MEDICINE CLINIC | Age: 53
End: 2023-02-21
Payer: COMMERCIAL

## 2023-02-21 VITALS
BODY MASS INDEX: 26.22 KG/M2 | DIASTOLIC BLOOD PRESSURE: 74 MMHG | HEART RATE: 65 BPM | TEMPERATURE: 98 F | SYSTOLIC BLOOD PRESSURE: 110 MMHG | RESPIRATION RATE: 20 BRPM | HEIGHT: 68 IN | WEIGHT: 173 LBS | OXYGEN SATURATION: 94 %

## 2023-02-21 DIAGNOSIS — R10.9 ABDOMINAL PAIN, UNSPECIFIED ABDOMINAL LOCATION: ICD-10-CM

## 2023-02-21 DIAGNOSIS — R73.03 PREDIABETES: ICD-10-CM

## 2023-02-21 DIAGNOSIS — E78.5 HYPERLIPIDEMIA, UNSPECIFIED HYPERLIPIDEMIA TYPE: Primary | ICD-10-CM

## 2023-02-21 LAB — HBA1C MFR BLD HPLC: 5.6 %

## 2023-02-21 PROCEDURE — 99213 OFFICE O/P EST LOW 20 MIN: CPT | Performed by: NURSE PRACTITIONER

## 2023-02-21 PROCEDURE — 83036 HEMOGLOBIN GLYCOSYLATED A1C: CPT | Performed by: NURSE PRACTITIONER

## 2023-02-21 RX ORDER — ATORVASTATIN CALCIUM 10 MG/1
10 TABLET, FILM COATED ORAL DAILY
Qty: 30 TABLET | Refills: 0 | Status: SHIPPED
Start: 2023-02-21 | End: 2023-02-23 | Stop reason: DRUGHIGH

## 2023-02-21 RX ORDER — ATORVASTATIN CALCIUM 10 MG/1
10 TABLET, FILM COATED ORAL DAILY
COMMUNITY
End: 2023-02-21 | Stop reason: SDUPTHER

## 2023-02-21 NOTE — PATIENT INSTRUCTIONS
It was a pleasure to see you today. Please use the Healthy plate diet for guidance as discussed. Exercise walk most days of week and increase heart rate. Return in 4 months for recheck.     Slater Kehr, NP

## 2023-02-21 NOTE — PROGRESS NOTES
Chief Complaint   Patient presents with    Abdominal Pain     Follow up - still comes and goes on occasion    Cholesterol Problem

## 2023-02-21 NOTE — PROGRESS NOTES
Steven Ross (: 1970) is a 45 y.o. male is here for evaluation of the following chief complaint(s): Abdominal Pain (Follow up - still comes and goes on occasion) and Cholesterol Problem        Subjective/Objective:   Apolinar Olmstead was seen today for Abdominal Pain (Follow up - still comes and goes on occasion) and Cholesterol Problem  Pt presents for follow up of abdominal pain elevated cholesterol and prediabetes. Discussed extensively diet and exercise. Hand out given about Healthy plate. Pt verbalized understanding. Review of Systems   Constitutional: Negative. HENT: Negative. Eyes: Negative. Respiratory: Negative. Cardiovascular: Negative. Gastrointestinal:  Positive for abdominal pain. Genitourinary: Negative. Musculoskeletal: Negative. Skin: Negative. Neurological: Negative. Endo/Heme/Allergies: Negative. Psychiatric/Behavioral: Negative. Physical Exam  Vitals reviewed. Constitutional:       General: He is not in acute distress. Appearance: Normal appearance. He is normal weight. He is not ill-appearing. HENT:      Head: Normocephalic and atraumatic. Right Ear: External ear normal.      Left Ear: External ear normal.      Nose: Nose normal.      Mouth/Throat:      Mouth: Mucous membranes are moist.   Eyes:      Conjunctiva/sclera: Conjunctivae normal.   Cardiovascular:      Rate and Rhythm: Normal rate and regular rhythm. Pulmonary:      Effort: Pulmonary effort is normal.      Breath sounds: Normal breath sounds. Abdominal:      General: Abdomen is flat. Bowel sounds are normal. There is no distension. Palpations: Abdomen is soft. There is no hepatomegaly or splenomegaly. Tenderness: There is abdominal tenderness in the right lower quadrant. There is no right CVA tenderness, left CVA tenderness, guarding or rebound. Hernia: No hernia is present. Comments: Mild tenderness with palpation.     Musculoskeletal: General: Normal range of motion. Cervical back: Normal range of motion. Skin:     General: Skin is warm and dry. Capillary Refill: Capillary refill takes less than 2 seconds. Neurological:      General: No focal deficit present. Mental Status: He is alert and oriented to person, place, and time. Psychiatric:         Mood and Affect: Mood normal.         Behavior: Behavior normal.         Thought Content: Thought content normal.         Judgment: Judgment normal.        /74 (BP 1 Location: Left arm, BP Patient Position: Sitting, BP Cuff Size: Large adult)   Pulse 65   Temp 98 °F (36.7 °C) (Temporal)   Resp 20   Ht 5' 8\" (1.727 m)   Wt 173 lb (78.5 kg)   SpO2 94%   BMI 26.30 kg/m²      Office Visit on 02/21/2023   Component Date Value Ref Range Status    Hemoglobin A1c (POC) 02/21/2023 5.6  % Final   Office Visit on 10/05/2022   Component Date Value Ref Range Status    WBC 10/05/2022 8.3  3.4 - 10.8 x10E3/uL Final    RBC 10/05/2022 5.31  4.14 - 5.80 x10E6/uL Final    HGB 10/05/2022 15.4  13.0 - 17.7 g/dL Final    HCT 10/05/2022 47.5  37.5 - 51.0 % Final    MCV 10/05/2022 90  79 - 97 fL Final    MCH 10/05/2022 29.0  26.6 - 33.0 pg Final    MCHC 10/05/2022 32.4  31.5 - 35.7 g/dL Final    RDW 10/05/2022 13.1  11.6 - 15.4 % Final    PLATELET 03/89/8710 967  150 - 450 x10E3/uL Final    NEUTROPHILS 10/05/2022 51  Not Estab. % Final    Lymphocytes 10/05/2022 38  Not Estab. % Final    MONOCYTES 10/05/2022 9  Not Estab. % Final    EOSINOPHILS 10/05/2022 1  Not Estab. % Final    BASOPHILS 10/05/2022 1  Not Estab. % Final    ABS. NEUTROPHILS 10/05/2022 4.3  1.4 - 7.0 x10E3/uL Final    Abs Lymphocytes 10/05/2022 3.1  0.7 - 3.1 x10E3/uL Final    ABS. MONOCYTES 10/05/2022 0.7  0.1 - 0.9 x10E3/uL Final    ABS. EOSINOPHILS 10/05/2022 0.1  0.0 - 0.4 x10E3/uL Final    ABS. BASOPHILS 10/05/2022 0.0  0.0 - 0.2 x10E3/uL Final    IMMATURE GRANULOCYTES 10/05/2022 0  Not Estab. % Final    ABS. IMM. GRANS. 10/05/2022 0.0  0.0 - 0.1 x10E3/uL Final    Glucose 10/05/2022 98  70 - 99 mg/dL Final                  **Please note reference interval change**    BUN 10/05/2022 11  6 - 24 mg/dL Final    Creatinine 10/05/2022 1.01  0.76 - 1.27 mg/dL Final    eGFR 10/05/2022 89  >59 mL/min/1.73 Final    BUN/Creatinine ratio 10/05/2022 11  9 - 20 Final    Sodium 10/05/2022 141  134 - 144 mmol/L Final    Potassium 10/05/2022 4.2  3.5 - 5.2 mmol/L Final    Chloride 10/05/2022 101  96 - 106 mmol/L Final    CO2 10/05/2022 26  20 - 29 mmol/L Final    Calcium 10/05/2022 10.0  8.7 - 10.2 mg/dL Final    Protein, total 10/05/2022 7.5  6.0 - 8.5 g/dL Final    Albumin 10/05/2022 5.0 (A)  3.8 - 4.9 g/dL Final    GLOBULIN, TOTAL 10/05/2022 2.5  1.5 - 4.5 g/dL Final    A-G Ratio 10/05/2022 2.0  1.2 - 2.2 Final    Bilirubin, total 10/05/2022 0.6  0.0 - 1.2 mg/dL Final    Alk. phosphatase 10/05/2022 45  44 - 121 IU/L Final    AST (SGOT) 10/05/2022 24  0 - 40 IU/L Final    ALT (SGPT) 10/05/2022 27  0 - 44 IU/L Final    Cholesterol, total 10/05/2022 265 (A)  100 - 199 mg/dL Final    Triglyceride 10/05/2022 83  0 - 149 mg/dL Final    HDL Cholesterol 10/05/2022 60  >39 mg/dL Final    VLDL, calculated 10/05/2022 14  5 - 40 mg/dL Final    LDL, calculated 10/05/2022 191 (A)  0 - 99 mg/dL Final    Comment 10/05/2022 Comment   Final    Comment: Possible Familial Hypercholesterolemia. FH should be suspected when  fasting LDL cholesterol is above 189 mg/dL or non-HDL cholesterol  is above 219 mg/dL. A family history of high cholesterol and heart  disease in 1st degree relatives should be collected.  J Clin Lipidol  2011;5:133-140      Hemoglobin A1c 10/05/2022 5.9 (A)  4.8 - 5.6 % Final    Comment:          Prediabetes: 5.7 - 6.4           Diabetes: >6.4           Glycemic control for adults with diabetes: <7.0      Estimated average glucose 10/05/2022 123  mg/dL Final    HCV Ab 10/05/2022 <0.1  0.0 - 0.9 s/co ratio Final    HCV Interpretation 10/05/2022 Comment   Final    Comment: Negative  Not infected with HCV, unless recent infection is suspected or other  evidence exists to indicate HCV infection. INTERPRETATION 10/05/2022 Note   Final    Supplemental report is available. Past Surgical History:   Procedure Laterality Date    HX HEENT Left 02/2022    tooth pulled was cracked-bottom back left    HX PARTIAL COLECTOMY      HX UROLOGICAL  2018    removed cancer from bladder        Past Medical History:   Diagnosis Date    Cancer ([facility] [address] 2018    bladder cancer/[facility]    Diverticulitis     Eczema         Current Outpatient Medications   Medication Instructions    atorvastatin (LIPITOR) 10 mg, Oral, DAILY        Assessment/Plan:     The above diagnosis is a chronic problem. We discussed expected course, resolution, and complications of diagnosis in detail. I advised him to call back or return to office if symptoms worsen/change/persist.        ICD-10-CM ICD-9-CM    1. Hyperlipidemia, unspecified hyperlipidemia type  E78.5 272.4 LIPID PANEL      LIPID PANEL      2. Prediabetes  R73.03 790.29 AMB POC HEMOGLOBIN A1C      3. Abdominal pain, unspecified abdominal location  R10.9 789.00 REFERRAL TO GASTROENTEROLOGY         Return in about 4 months (around 6/21/2023). An electronic signature was used to authenticate this note.   -- Briana Pennington NP

## 2023-02-22 LAB
CHOLEST SERPL-MCNC: 182 MG/DL (ref 100–199)
HDLC SERPL-MCNC: 54 MG/DL
IMP & REVIEW OF LAB RESULTS: NORMAL
LDLC SERPL CALC-MCNC: 114 MG/DL (ref 0–99)
TRIGL SERPL-MCNC: 78 MG/DL (ref 0–149)
VLDLC SERPL CALC-MCNC: 14 MG/DL (ref 5–40)

## 2023-02-23 RX ORDER — ATORVASTATIN CALCIUM 20 MG/1
20 TABLET, FILM COATED ORAL DAILY
Qty: 30 TABLET | Refills: 5 | Status: SHIPPED | OUTPATIENT
Start: 2023-02-23 | End: 2023-08-22

## 2023-10-05 ENCOUNTER — TELEPHONE (OUTPATIENT)
Facility: CLINIC | Age: 53
End: 2023-10-05

## 2023-10-05 DIAGNOSIS — Z12.5 SCREENING FOR PROSTATE CANCER: ICD-10-CM

## 2023-10-05 DIAGNOSIS — Z13.0 SCREENING FOR DEFICIENCY ANEMIA: ICD-10-CM

## 2023-10-05 DIAGNOSIS — R73.03 PREDIABETES: Primary | ICD-10-CM

## 2023-10-05 DIAGNOSIS — Z13.29 SCREENING FOR HYPOTHYROIDISM: ICD-10-CM

## 2023-10-05 NOTE — TELEPHONE ENCOUNTER
Spoke with patient. Unable to reschedule appt due work so will see Dr Alma Singleton. Labs ordered as requested prior to appt.

## 2023-10-05 NOTE — TELEPHONE ENCOUNTER
----- Message from Jenni Jang sent at 9/28/2023  9:23 AM EDT -----  Subject: Referral Request    Reason for referral request? labs for upcoming visit on 10/9, would like   to do ahead of time  Provider patient wants to be referred to(if known):     Provider Phone Number(if known):     Additional Information for Provider?   ---------------------------------------------------------------------------  --------------  [address]    [phone]; OK to leave message on voicemail  ---------------------------------------------------------------------------  --------------

## 2023-10-09 ENCOUNTER — OFFICE VISIT (OUTPATIENT)
Facility: CLINIC | Age: 53
End: 2023-10-09
Payer: COMMERCIAL

## 2023-10-09 VITALS
OXYGEN SATURATION: 95 % | BODY MASS INDEX: 25.91 KG/M2 | HEART RATE: 60 BPM | HEIGHT: 68 IN | RESPIRATION RATE: 16 BRPM | TEMPERATURE: 98.6 F | WEIGHT: 171 LBS | DIASTOLIC BLOOD PRESSURE: 62 MMHG | SYSTOLIC BLOOD PRESSURE: 99 MMHG

## 2023-10-09 DIAGNOSIS — M77.8 THUMB TENDONITIS: Primary | ICD-10-CM

## 2023-10-09 DIAGNOSIS — E78.2 MIXED HYPERLIPIDEMIA: ICD-10-CM

## 2023-10-09 DIAGNOSIS — K57.92 DIVERTICULITIS: ICD-10-CM

## 2023-10-09 PROCEDURE — 99214 OFFICE O/P EST MOD 30 MIN: CPT | Performed by: FAMILY MEDICINE

## 2023-10-09 RX ORDER — ATORVASTATIN CALCIUM 20 MG/1
20 TABLET, FILM COATED ORAL DAILY
Qty: 90 TABLET | Refills: 3 | Status: SHIPPED | OUTPATIENT
Start: 2023-10-09

## 2023-10-09 ASSESSMENT — PATIENT HEALTH QUESTIONNAIRE - PHQ9
SUM OF ALL RESPONSES TO PHQ QUESTIONS 1-9: 0
SUM OF ALL RESPONSES TO PHQ9 QUESTIONS 1 & 2: 0
SUM OF ALL RESPONSES TO PHQ QUESTIONS 1-9: 0
1. LITTLE INTEREST OR PLEASURE IN DOING THINGS: 0
2. FEELING DOWN, DEPRESSED OR HOPELESS: 0

## 2023-10-09 NOTE — PROGRESS NOTES
Chief Complaint   Patient presents with    Medication Refill       Patient has not been out of the country in (14 months), NO diarrhea, NO cough, NO chest conjestion, NO temp. Pt has not been around anyone with these symptoms. Health Maintenance reviewed. I have reviewed the patient's medical history in detail and updated the computerized patient record. 1. \"Have you been to the ER, urgent care clinic since your last visit? No  Hospitalized since your last visit? \" no    2. \"Have you seen or consulted any other health care providers outside of the [address] [place] [address] since your last visit? \" no     3. For patients aged 43-73: Has the patient had a colonoscopy / FIT/ Cologuard?  no

## 2024-05-30 LAB
ALBUMIN SERPL-MCNC: 4.2 G/DL (ref 3.8–4.9)
ALBUMIN/GLOB SERPL: 1.6 {RATIO} (ref 1.2–2.2)
ALP SERPL-CCNC: 48 IU/L (ref 44–121)
ALT SERPL-CCNC: 25 IU/L (ref 0–44)
AST SERPL-CCNC: 22 IU/L (ref 0–40)
BILIRUB SERPL-MCNC: 0.5 MG/DL (ref 0–1.2)
BUN SERPL-MCNC: 15 MG/DL (ref 6–24)
BUN/CREAT SERPL: 17 (ref 9–20)
CALCIUM SERPL-MCNC: 9.6 MG/DL (ref 8.7–10.2)
CHLORIDE SERPL-SCNC: 102 MMOL/L (ref 96–106)
CHOLEST SERPL-MCNC: 203 MG/DL (ref 100–199)
CO2 SERPL-SCNC: 22 MMOL/L (ref 20–29)
CREAT SERPL-MCNC: 0.9 MG/DL (ref 0.76–1.27)
EGFRCR SERPLBLD CKD-EPI 2021: 101 ML/MIN/1.73
GLOBULIN SER CALC-MCNC: 2.6 G/DL (ref 1.5–4.5)
GLUCOSE SERPL-MCNC: 103 MG/DL (ref 70–99)
HDLC SERPL-MCNC: 53 MG/DL
IMP & REVIEW OF LAB RESULTS: NORMAL
LDLC SERPL CALC-MCNC: 128 MG/DL (ref 0–99)
POTASSIUM SERPL-SCNC: 4.2 MMOL/L (ref 3.5–5.2)
PROT SERPL-MCNC: 6.8 G/DL (ref 6–8.5)
SODIUM SERPL-SCNC: 139 MMOL/L (ref 134–144)
TRIGL SERPL-MCNC: 121 MG/DL (ref 0–149)
VLDLC SERPL CALC-MCNC: 22 MG/DL (ref 5–40)

## 2024-05-31 ENCOUNTER — OFFICE VISIT (OUTPATIENT)
Facility: CLINIC | Age: 54
End: 2024-05-31
Payer: COMMERCIAL

## 2024-05-31 VITALS
BODY MASS INDEX: 26.83 KG/M2 | DIASTOLIC BLOOD PRESSURE: 75 MMHG | TEMPERATURE: 97.3 F | WEIGHT: 177 LBS | SYSTOLIC BLOOD PRESSURE: 116 MMHG | HEART RATE: 77 BPM | OXYGEN SATURATION: 93 % | HEIGHT: 68 IN | RESPIRATION RATE: 16 BRPM

## 2024-05-31 DIAGNOSIS — E78.5 HYPERLIPIDEMIA, UNSPECIFIED HYPERLIPIDEMIA TYPE: Primary | ICD-10-CM

## 2024-05-31 DIAGNOSIS — Z23 NEED FOR PROPHYLACTIC VACCINATION AND INOCULATION AGAINST VARICELLA: ICD-10-CM

## 2024-05-31 DIAGNOSIS — R73.03 PREDIABETES: ICD-10-CM

## 2024-05-31 PROCEDURE — 99213 OFFICE O/P EST LOW 20 MIN: CPT | Performed by: NURSE PRACTITIONER

## 2024-05-31 RX ORDER — ATORVASTATIN CALCIUM 40 MG/1
40 TABLET, FILM COATED ORAL DAILY
Qty: 30 TABLET | Refills: 3 | Status: SHIPPED | OUTPATIENT
Start: 2024-05-31

## 2024-05-31 SDOH — ECONOMIC STABILITY: FOOD INSECURITY: WITHIN THE PAST 12 MONTHS, THE FOOD YOU BOUGHT JUST DIDN'T LAST AND YOU DIDN'T HAVE MONEY TO GET MORE.: NEVER TRUE

## 2024-05-31 SDOH — ECONOMIC STABILITY: FOOD INSECURITY: WITHIN THE PAST 12 MONTHS, YOU WORRIED THAT YOUR FOOD WOULD RUN OUT BEFORE YOU GOT MONEY TO BUY MORE.: NEVER TRUE

## 2024-05-31 SDOH — ECONOMIC STABILITY: HOUSING INSECURITY
IN THE LAST 12 MONTHS, WAS THERE A TIME WHEN YOU DID NOT HAVE A STEADY PLACE TO SLEEP OR SLEPT IN A SHELTER (INCLUDING NOW)?: NO

## 2024-05-31 SDOH — ECONOMIC STABILITY: INCOME INSECURITY: HOW HARD IS IT FOR YOU TO PAY FOR THE VERY BASICS LIKE FOOD, HOUSING, MEDICAL CARE, AND HEATING?: NOT HARD AT ALL

## 2024-05-31 ASSESSMENT — ENCOUNTER SYMPTOMS
CONSTIPATION: 0
WHEEZING: 0
ABDOMINAL PAIN: 0
DIARRHEA: 0
NAUSEA: 0
COUGH: 0
SHORTNESS OF BREATH: 0
CHEST TIGHTNESS: 0

## 2024-05-31 ASSESSMENT — PATIENT HEALTH QUESTIONNAIRE - PHQ9
SUM OF ALL RESPONSES TO PHQ9 QUESTIONS 1 & 2: 0
SUM OF ALL RESPONSES TO PHQ QUESTIONS 1-9: 0
1. LITTLE INTEREST OR PLEASURE IN DOING THINGS: NOT AT ALL
SUM OF ALL RESPONSES TO PHQ QUESTIONS 1-9: 0
2. FEELING DOWN, DEPRESSED OR HOPELESS: NOT AT ALL

## 2024-05-31 NOTE — PROGRESS NOTES
Jose Mcgarry (:  1970) is a 53 y.o. male who presents to the office today for the following:  Annual Exam and Discuss Labs      Assessment & Plan   ASSESSMENT/PLAN:  1. Hyperlipidemia, unspecified hyperlipidemia type  2. Prediabetes  3. Need for prophylactic vaccination and inoculation against varicella  -     zoster recombinant adjuvanted vaccine (SHINGRIX) 50 MCG/0.5ML SUSR injection; Inject 0.5 mLs into the muscle once for 1 dose 50 MCG IM then repeat 2-6 months., Disp-1 each, R-1Print      No results found for any visits on 24.     Return in about 6 months (around 2024).         Subjective   SUBJECTIVE/OBJECTIVE:  HPI  Pt presents to the clinic to discuss labs that were drawn a couple of days ago by Dr Gaullpa. Pt continues  to have elevated LDL. A1c  today was 5.4. Will increase Lipitor to 40 mg. Pt denies any other complaints or concerns.   Allergies   Allergen Reactions   • Penicillins Hives     As child, hives on arms-treated by stopping med  Keflex=unsure     Current Outpatient Medications   Medication Sig Dispense Refill   • atorvastatin (LIPITOR) 40 MG tablet Take 1 tablet by mouth daily 30 tablet 3   • zoster recombinant adjuvanted vaccine (SHINGRIX) 50 MCG/0.5ML SUSR injection Inject 0.5 mLs into the muscle once for 1 dose 50 MCG IM then repeat 2-6 months. 1 each 1     No current facility-administered medications for this visit.        Review of Systems   Constitutional:  Negative for activity change, fatigue, fever and unexpected weight change.   HENT:  Negative for nosebleeds.    Eyes:  Negative for visual disturbance.   Respiratory:  Negative for cough, chest tightness, shortness of breath and wheezing.    Cardiovascular:  Negative for chest pain, palpitations and leg swelling.   Gastrointestinal:  Negative for abdominal pain, constipation, diarrhea and nausea.   Endocrine: Negative for polydipsia and polyuria.   Genitourinary:  Negative for flank pain.   Musculoskeletal:

## 2024-05-31 NOTE — PATIENT INSTRUCTIONS
instructions adapted under license by [employer]. If you have questions about a medical condition or this instruction, always ask your healthcare professional. [employer], [facility] disclaims any warranty or liability for your use of this information.       Patient Education        Walking for Exercise: Care Instructions  Overview     Walking is one of the easiest ways to get the exercise you need for good health. A brisk, 30-minute walk each day can help you feel better and have more energy. It can help you lower your risk of disease. Walking can help you keep your bones strong and your heart healthy.  Check with your doctor before you start a walking plan if you have heart problems, other health issues, or you have not been active in a long time. Follow your doctor's instructions for safe levels of exercise.  Follow-up care is a key part of your treatment and safety. Be sure to make and go to all appointments, and call your doctor if you are having problems. It's also a good idea to know your test results and keep a list of the medicines you take.  How can you care for yourself at home?  Getting started  Start slowly and set a short-term goal. For example, walk for 5 or 10 minutes every day.  Bit by bit, increase the amount you walk every day. Try for at least 30 minutes on most days of the week. You also may want to swim, bike, or do other activities.  If finding enough time is a problem, it's fine to be active in shorter periods of time throughout your day.  To get the heart-healthy benefits of walking, you need to walk briskly enough to increase your heart rate and breathing, but not so fast that you can't talk comfortably.  Wear comfortable shoes that fit well and provide good support for your feet and ankles.  Staying with your plan  After you've made walking a habit, set a longer-term goal. You may want to set a goal of walking briskly for longer or walking farther. Experts say to do 2½ hours (150

## 2025-01-16 ENCOUNTER — TELEPHONE (OUTPATIENT)
Facility: CLINIC | Age: 55
End: 2025-01-16

## 2025-01-16 RX ORDER — ATORVASTATIN CALCIUM 40 MG/1
40 TABLET, FILM COATED ORAL DAILY
Qty: 90 TABLET | Refills: 0 | Status: SHIPPED | OUTPATIENT
Start: 2025-01-16

## 2025-04-16 RX ORDER — ATORVASTATIN CALCIUM 40 MG/1
40 TABLET, FILM COATED ORAL DAILY
Qty: 30 TABLET | Refills: 0 | Status: SHIPPED | OUTPATIENT
Start: 2025-04-16

## 2025-05-19 RX ORDER — ATORVASTATIN CALCIUM 40 MG/1
40 TABLET, FILM COATED ORAL DAILY
Qty: 30 TABLET | Refills: 0 | Status: SHIPPED | OUTPATIENT
Start: 2025-05-19

## 2025-07-12 RX ORDER — ATORVASTATIN CALCIUM 40 MG/1
40 TABLET, FILM COATED ORAL DAILY
Qty: 30 TABLET | Refills: 0 | OUTPATIENT
Start: 2025-07-12

## 2025-08-01 ENCOUNTER — OFFICE VISIT (OUTPATIENT)
Facility: CLINIC | Age: 55
End: 2025-08-01
Payer: COMMERCIAL

## 2025-08-01 VITALS
BODY MASS INDEX: 26.37 KG/M2 | TEMPERATURE: 97.7 F | WEIGHT: 174 LBS | HEART RATE: 53 BPM | SYSTOLIC BLOOD PRESSURE: 117 MMHG | RESPIRATION RATE: 16 BRPM | HEIGHT: 68 IN | OXYGEN SATURATION: 94 % | DIASTOLIC BLOOD PRESSURE: 73 MMHG

## 2025-08-01 DIAGNOSIS — Z13.0 SCREENING FOR DEFICIENCY ANEMIA: ICD-10-CM

## 2025-08-01 DIAGNOSIS — R73.03 PREDIABETES: ICD-10-CM

## 2025-08-01 DIAGNOSIS — Z23 NEED FOR PROPHYLACTIC VACCINATION AND INOCULATION AGAINST VARICELLA: ICD-10-CM

## 2025-08-01 DIAGNOSIS — Z13.29 SCREENING FOR HYPOTHYROIDISM: ICD-10-CM

## 2025-08-01 DIAGNOSIS — E78.5 HYPERLIPIDEMIA, UNSPECIFIED HYPERLIPIDEMIA TYPE: Primary | ICD-10-CM

## 2025-08-01 PROCEDURE — 99213 OFFICE O/P EST LOW 20 MIN: CPT | Performed by: NURSE PRACTITIONER

## 2025-08-01 RX ORDER — ATORVASTATIN CALCIUM 40 MG/1
40 TABLET, FILM COATED ORAL DAILY
Qty: 90 TABLET | Refills: 1 | Status: SHIPPED | OUTPATIENT
Start: 2025-08-01

## 2025-08-01 SDOH — ECONOMIC STABILITY: FOOD INSECURITY: WITHIN THE PAST 12 MONTHS, THE FOOD YOU BOUGHT JUST DIDN'T LAST AND YOU DIDN'T HAVE MONEY TO GET MORE.: NEVER TRUE

## 2025-08-01 SDOH — ECONOMIC STABILITY: FOOD INSECURITY: WITHIN THE PAST 12 MONTHS, YOU WORRIED THAT YOUR FOOD WOULD RUN OUT BEFORE YOU GOT MONEY TO BUY MORE.: NEVER TRUE

## 2025-08-01 ASSESSMENT — ENCOUNTER SYMPTOMS
CONSTIPATION: 0
NAUSEA: 0
SHORTNESS OF BREATH: 0
CHEST TIGHTNESS: 0
COUGH: 0
DIARRHEA: 0
WHEEZING: 0
ABDOMINAL PAIN: 0

## 2025-08-01 ASSESSMENT — PATIENT HEALTH QUESTIONNAIRE - PHQ9
SUM OF ALL RESPONSES TO PHQ QUESTIONS 1-9: 0
1. LITTLE INTEREST OR PLEASURE IN DOING THINGS: NOT AT ALL
SUM OF ALL RESPONSES TO PHQ QUESTIONS 1-9: 0
1. LITTLE INTEREST OR PLEASURE IN DOING THINGS: NOT AT ALL
2. FEELING DOWN, DEPRESSED OR HOPELESS: NOT AT ALL
2. FEELING DOWN, DEPRESSED OR HOPELESS: NOT AT ALL
SUM OF ALL RESPONSES TO PHQ QUESTIONS 1-9: 0

## 2025-08-01 NOTE — PROGRESS NOTES
Jose Mcgarry (:  1970) is a 54 y.o. male who presents to the office today for the following:  Cholesterol Problem (Follow up 1 year cholesterol med refill )      Assessment & Plan   ASSESSMENT/PLAN:  1. Hyperlipidemia, unspecified hyperlipidemia type  -     Lipid Panel; Future  2. Prediabetes  -     Urinalysis; Future  -     Comprehensive Metabolic Panel; Future  -     Hemoglobin A1C; Future  3. Need for prophylactic vaccination and inoculation against varicella  -     zoster recombinant adjuvanted vaccine (SHINGRIX) 50 MCG/0.5ML SUSR injection; Inject 0.5 mLs into the muscle once for 1 dose 50 MCG IM then repeat 2-6 months., Disp-1 each, R-0Print  4. Screening for deficiency anemia  -     CBC with Auto Differential; Future  5. Screening for hypothyroidism  -     TSH; Future      No results found for any visits on 25.     Return in about 6 months (around 2026).         Subjective   SUBJECTIVE/OBJECTIVE:  Pt presents to the clinic for yearly labs and examination. Pt also needs refill of cholesterol medication. Pt has been out for 3 weeks due needing an appt first as not been seen in the clinic in a year. Pt denies any new complaints or concerns today. Pmhx of hyperlipidemia and prediabetes.         Allergies   Allergen Reactions    Penicillins Hives     As child, hives on arms-treated by stopping med  Keflex=unsure     Current Outpatient Medications   Medication Sig Dispense Refill    atorvastatin (LIPITOR) 40 MG tablet Take 1 tablet by mouth daily 90 tablet 1    zoster recombinant adjuvanted vaccine (SHINGRIX) 50 MCG/0.5ML SUSR injection Inject 0.5 mLs into the muscle once for 1 dose 50 MCG IM then repeat 2-6 months. 1 each 0     No current facility-administered medications for this visit.        Review of Systems   Constitutional:  Negative for activity change, fatigue, fever and unexpected weight change.   HENT:  Negative for nosebleeds.    Eyes:  Negative for visual disturbance.

## 2025-08-01 NOTE — PATIENT INSTRUCTIONS
It was a pleasure to see you today.    1. Hyperlipidemia, unspecified hyperlipidemia type    2. Prediabetes    3. Need for prophylactic vaccination and inoculation against varicella    4. Screening for deficiency anemia    5. Screening for hypothyroidism         No follow-ups on file.     Thank you for choosing [facility].    ROSAS Guevara - NP

## 2025-08-01 NOTE — PROGRESS NOTES
Chief Complaint   Patient presents with    Cholesterol Problem     Follow up 1 year cholesterol med refill

## 2025-08-04 ENCOUNTER — RESULTS FOLLOW-UP (OUTPATIENT)
Facility: CLINIC | Age: 55
End: 2025-08-04

## (undated) DEVICE — GDWIRE URET STR STD .035X150 -- ZIPWIRE STD

## (undated) DEVICE — DRAPE,ROBOTICS,STERILE: Brand: MEDLINE

## (undated) DEVICE — SUTURE PROL SZ 2-0 L36IN NONABSORBABLE BLU SH L26MM 1/2 CIR 8523H

## (undated) DEVICE — ROCKER SWITCH PENCIL BLADE ELECTRODE, HOLSTER: Brand: EDGE

## (undated) DEVICE — 3M™ IOBAN™ 2 ANTIMICROBIAL INCISE DRAPE 6650EZ: Brand: IOBAN™ 2

## (undated) DEVICE — LEGGINGS: Brand: CONVERTORS

## (undated) DEVICE — TOTAL TRAY, 16FR 10ML SIL FOLEY, URN: Brand: MEDLINE

## (undated) DEVICE — GOWN,SIRUS,FABRNF,XL,20/CS: Brand: MEDLINE

## (undated) DEVICE — VESSEL SEALER: Brand: ENDOWRIST

## (undated) DEVICE — SOLUTION IRRIG 1000ML STRL H2O USP PLAS POUR BTL

## (undated) DEVICE — TOWEL,OR,DSP,ST,BLUE,STD,2/PK,40PK/CS: Brand: MEDLINE

## (undated) DEVICE — SOLUTION IRRIGATION H2O 0797305] ICU MEDICAL INC]

## (undated) DEVICE — GLOVE ORANGE PI 7   MSG9070

## (undated) DEVICE — PURSE STRING DEVICE: Brand: PURSTRING

## (undated) DEVICE — HYPODERMIC SAFETY NEEDLE: Brand: MAGELLAN

## (undated) DEVICE — TROCAR: Brand: KII FIOS FIRST ENTRY

## (undated) DEVICE — SUTURE VCRL SZ 2-0 L27IN ABSRB UD L26MM SH 1/2 CIR J417H

## (undated) DEVICE — ARM DRAPE

## (undated) DEVICE — SUTURE PERMAHAND SZ 0 L18IN NONABSORBABLE BLK SILK BRAID A186H

## (undated) DEVICE — SUTURE PDS II SZ 0 L60IN ABSRB VLT L48MM CTX 1/2 CIR Z990G

## (undated) DEVICE — MARKER,SKIN,WI/RULER AND LABELS: Brand: MEDLINE

## (undated) DEVICE — SPONGE LAP 18X18IN STRL -- 5/PK

## (undated) DEVICE — VISUALIZATION SYSTEM: Brand: CLEARIFY

## (undated) DEVICE — GENERAL LAPAROSCOPY-MRMC: Brand: MEDLINE INDUSTRIES, INC.

## (undated) DEVICE — CYSTO MRMC: Brand: MEDLINE INDUSTRIES, INC.

## (undated) DEVICE — ACCESS PLATFORM FOR MINIMALLY INVASIVE SURGERY: Brand: GELPOINT®  MINI ADVANCED ACCESS PLATFORM

## (undated) DEVICE — SEAL UNIV 5-8MM DISP BX/10 -- DA VINCI XI - SNGL USE

## (undated) DEVICE — STAPLER 60 RELOAD GREEN: Brand: SUREFORM

## (undated) DEVICE — STAPLER INT L28CM DIA29MM CLS STPL H10-2.5MM OPN LEG L5.5MM

## (undated) DEVICE — SUTURE MCRYL SZ 4-0 L27IN ABSRB UD L19MM PS-2 1/2 CIR PRIM Y426H

## (undated) DEVICE — INSUFFLATION NEEDLE: Brand: SURGINEEDLE

## (undated) DEVICE — Device

## (undated) DEVICE — BLADELESS OBTURATOR: Brand: WECK VISTA

## (undated) DEVICE — TUBING IRRIG L77IN DIA0.241IN L BOR FOR CYSTO W/ NVENT

## (undated) DEVICE — STAPLER 60: Brand: SUREFORM

## (undated) DEVICE — GRASPER ENDOSCP TIP L10MM ANVIL ROT RATCH HNDL DISP

## (undated) DEVICE — CATH URET 5FRX70CM POLYUR -- CONVERT TO ITEM 106403

## (undated) DEVICE — GLOVE SURG SZ 75 L12IN FNGR THK79MIL GRN LTX FREE

## (undated) DEVICE — SYR 10ML LUER LOK 1/5ML GRAD --